# Patient Record
Sex: FEMALE | Race: WHITE | NOT HISPANIC OR LATINO | Employment: UNEMPLOYED | URBAN - METROPOLITAN AREA
[De-identification: names, ages, dates, MRNs, and addresses within clinical notes are randomized per-mention and may not be internally consistent; named-entity substitution may affect disease eponyms.]

---

## 2016-11-28 LAB — HCV AB SER-ACNC: NEGATIVE

## 2017-01-04 ENCOUNTER — ALLSCRIPTS OFFICE VISIT (OUTPATIENT)
Dept: OTHER | Facility: OTHER | Age: 57
End: 2017-01-04

## 2017-01-04 LAB — S PYO AG THROAT QL: NEGATIVE

## 2017-05-22 ENCOUNTER — GENERIC CONVERSION - ENCOUNTER (OUTPATIENT)
Dept: OTHER | Facility: OTHER | Age: 57
End: 2017-05-22

## 2017-05-23 ENCOUNTER — ALLSCRIPTS OFFICE VISIT (OUTPATIENT)
Dept: OTHER | Facility: OTHER | Age: 57
End: 2017-05-23

## 2017-08-21 ENCOUNTER — ALLSCRIPTS OFFICE VISIT (OUTPATIENT)
Dept: OTHER | Facility: OTHER | Age: 57
End: 2017-08-21

## 2017-08-21 DIAGNOSIS — E78.1 PURE HYPERGLYCERIDEMIA: ICD-10-CM

## 2017-08-21 DIAGNOSIS — Z11.59 ENCOUNTER FOR SCREENING FOR OTHER VIRAL DISEASES: ICD-10-CM

## 2017-10-03 LAB — HCV AB SER-ACNC: NEGATIVE

## 2017-10-24 ENCOUNTER — GENERIC CONVERSION - ENCOUNTER (OUTPATIENT)
Dept: OTHER | Facility: OTHER | Age: 57
End: 2017-10-24

## 2017-10-30 ENCOUNTER — ALLSCRIPTS OFFICE VISIT (OUTPATIENT)
Dept: OTHER | Facility: OTHER | Age: 57
End: 2017-10-30
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
## 2017-10-30 DIAGNOSIS — I48.0 PAROXYSMAL ATRIAL FIBRILLATION (HCC): ICD-10-CM

## 2017-11-17 ENCOUNTER — GENERIC CONVERSION - ENCOUNTER (OUTPATIENT)
Dept: OTHER | Facility: OTHER | Age: 57
End: 2017-11-17

## 2017-11-30 ENCOUNTER — GENERIC CONVERSION - ENCOUNTER (OUTPATIENT)
Dept: OTHER | Facility: OTHER | Age: 57
End: 2017-11-30

## 2017-12-01 ENCOUNTER — GENERIC CONVERSION - ENCOUNTER (OUTPATIENT)
Dept: INTERNAL MEDICINE CLINIC | Facility: CLINIC | Age: 57
End: 2017-12-01

## 2018-01-10 NOTE — MISCELLANEOUS
Assessment    1  Paroxysmal atrial fibrillation (427 31) (I48 0)   2  Tick bite (919 4,E906 4) (W57 XXXA)    Plan  Need for prophylactic vaccination and inoculation against influenza    · Fluzone Quadrivalent 0 5 ML Intramuscular Suspension Prefilled Syringe   For: Need for prophylactic vaccination and inoculation against influenza; Ordered By:Reyes, Lea; Effective Date:30Oct2017; Administered by: Rahul Sheth: 10/30/2017 11:49:00 AM; Last Updated By: Rahul Sheth; 10/30/2017 11:50:29 AM  Paroxysmal atrial fibrillation    · (1) T3 TOTAL; Status:Active; Requested GCZ:[id number];    Perform:[facility]; PVB:[id number]; Ordered; For:Paroxysmal atrial fibrillation; Ordered By:Reyes, Lea;   · (1) T4, FREE; Status:Active; Requested SJW:[id number];    Perform:[facility]; MFS:21NUG3387; Ordered; For:Paroxysmal atrial fibrillation; Ordered By:Reyes, Lea;   · (1) TSH; Status:Active; Requested CJS:[id number];    Perform:[facility]; MNO:05RKN3392; Ordered; For:Paroxysmal atrial fibrillation; Ordered By:Reyes, Lea;  Tick bite    · (1) LYME ANTIBODY PROFILE W/REFLEX TO WESTERN BLOT; Status:Active; Requested  VKU:[id number];    Perform:[facility]; BWW:25IWK7465; Ordered; For:Tick bite; Ordered By:Reyes, Lea;    Discussion/Summary  Discussion Summary:   Anticipate cardiac monitoring, f/u with cariology  Cont metoprolol  Obtain BP cuff to check at home  Obtain thyroid function tests although the one in the ER was normal  RTO as scheduled in Feb  Counseling Documentation With Imm: The patient was counseled regarding diagnostic results, impressions  Medication SE Review and Pt Understands Tx: Possible side effects of new medications were reviewed with the patient/guardian today  The treatment plan was reviewed with the patient/guardian   The patient/guardian understands and agrees with the treatment plan      Chief Complaint  Chief Complaint Free Text Note Form: TCM      History of Present Illness  TCM Communication [facility]: The patient is being contacted for follow-up after hospitalization  Hospital records were reviewed  She was hospitalized at [facility]  The date of admission: 10/20/2017, date of discharge: 10/22/2017  Diagnosis: Atrial fibrilation  She was discharged to home  Medications were not reviewed today  She scheduled a follow up appointment  The patient is currently asymptomatic  Counseling was provided to the patient  Communication performed and completed by Jamin Sofia   HPI: Admitted overnight after presenting to the ER with abd discomfort  She initially thought it was a UTI which did not get better with Cipro-took 5 doses and hesitancy resolved  While in the ER, she went into Afib, felt her heart racing, HR in the 190s treated with a Cardizem drip  She underwent an extensive work up including CT chest abd pelvis echo and abd US, all unremarkable  She was also evaluated by cardiology, urology, and GI  Discharged on metoprolol 6 25mg BID  She is already on a baby ASA  Appt with cariology in about 2 weeks  TSH was 0 38 in the hospital which bothers her since it was 2-3 last year  GI symptoms resolved after she was placed in Flagyl by her GI for SIBO  Requesting Lyme test for h/o tick bites  MTX and prednisone doses recently increased by her rheumatologist       Review of Systems  Complete-Female:   Constitutional: feeling tired, but no fever, no recent weight gain, no chills and no recent weight loss  Cardiovascular: as noted in HPI, no chest pain, no palpitations and no lower extremity edema  Respiratory: no shortness of breath and no cough  Gastrointestinal: as noted in HPI  Genitourinary: as noted in HPI and no dysuria  Active Problems    1  Closed nondisplaced lateral mass fracture of first cervical vertebra, sequela (905 1)   (S12 041S)   2  Crohn's disease (555 9) (K50 90)   3  Current chronic use of systemic steroids (V58 65) (Z79 52)   4  Depression (311) (F32 9)   5  Essential hypertriglyceridemia (272 1) (E78 1)   6  Insomnia (780 52) (G47 00)   7  Laceration of forehead, sequela (906 0) (S01 81XS)   8  Long-term use of Plaquenil (V58 69) (Z79 899)   9  Need for hepatitis C screening test (V73 89) (Z11 59)   10  Nephrolithiasis (592 0) (N20 0)   11  Rheumatoid arthritis (714 0) (M06 9)   12  Symptomatic menopausal or female climacteric states (627 2) (N95 1)   13  Urinary hesitancy (788 64) (R39 11)    Past Medical History    1  History of Concussion, with LOC of 30 min or less, sequela   2  History of fatigue (V13 89) (Z87 898)   3  History of urinary tract infection (V13 02) (Z87 440)   4  History of Sore throat (462) (J02 9)   5  History of Sore throat (462) (J02 9)    Surgical History  Surgical History Reviewed: The surgical history was reviewed and updated today  Family History  Family History    1  Family history of CAD (coronary artery disease)   2  Family history of cardiac disorder (V17 49) (Z82 49)  Family History Reviewed: The family history was reviewed and updated today  Social History    · Alcohol use (V49 89) (Z78 9)   ·    · Former smoker (U02 80) (G74 955)   · Occasional alcohol use   · Pets/Animals: Dog   · Two children  Social History Reviewed: The social history was reviewed and updated today  Current Meds   1  Cyanocobalamin 1000 MCG/ML Injection Solution; INJECT 1 ML INTRAMUSCULARLY   WEEKLY; Therapy: 33TTE5973 to (Evaluate:30Oct2017)  Requested for: 24WSV8688; Last   Rx:71Zdr7033 Ordered   2  DULoxetine HCl - 60 MG Oral Capsule Delayed Release Particles; take 1 capsule daily; Therapy: 90JPJ2929 to (Evaluate:16Nov2017)  Requested for: 21Nov2016; Last   Rx:21Nov2016 Ordered   3  Folic Acid 1 MG Oral Tablet; TAKE 1 TABLET DAILY; Therapy: 85ZVY2934 to (Evaluate:41Nie5264) Recorded   4  LORazepam 0 5 MG Oral Tablet; TAKE 1 TABLET DAILY AS NEEDED; Therapy: 93Wpn2092 to (Evaluate:22Apr2017);  Last Rx: 49OQN0482 Ordered   5  LORazepam 1 MG Oral Tablet; take 1 tablet by mouth at bedtime as needed; Therapy: 58VQO1980 to ([facility])  Requested for: 98Gij0034; Last   Rx:96Qdp7830 Ordered   6  MedroxyPROGESTERone Acetate 2 5 MG Oral Tablet; TAKE 1 TABLET DAILY 10-14 days   a month; Therapy: 29HGM8259 to Recorded   7  Menest 0 625 MG Oral Tablet; TAKE 1 TABLET DAILY; Therapy: 80BGT0564 to Recorded   8  Methotrexate Sodium 250 MG/10ML Injection Solution; 1ml weekly; Therapy: 34ATG6730 to Recorded   9  Metoprolol Tartrate 25 MG Oral Tablet; 1/2 tab bid; Therapy: 22EIV4736 to Recorded   10  PredniSONE 5 MG Oral Tablet; TAKE 1 0 5 TABLET BY MOUTH ONCE DAILY -;    Therapy: 65GRL6122 to (Evaluate:04Zeh4125)  Requested for: 78JJB7376 Recorded   11  Syringe 2G X 1-1/4" 3 ML Miscellaneous; USE AS DIRECTED WEEKLY WITH THE B12; Therapy: 80EUC5593 to (Last Rx:61Lsy7649)  Requested for: 05GGC0660 Ordered   12  TraMADol HCl - 50 MG Oral Tablet; TAKE 1 TABLET BY MOUTH EVERY 6 TO 8 HOURS    AS NEEDED FOR PAIN;    Therapy: 03LZS4741 to (Evaluate:22Jun2017); Last Rx:41Nob3107 Ordered   13  Tylenol 325 MG Oral Tablet; TAKE 1 OR 2 TABLET EVERY 4 HOURS AS NEEDED FOR    PAIN/FEVER; Therapy: 67FMR7541 to Recorded  Medication List Reviewed: The medication list was reviewed and updated today  Allergies    1  Orencia   2  Remicade    3  No Known Environmental Allergies   4  No Known Food Allergies    Vitals  Signs   Recorded: 98JGG5331 11:03AM   Heart Rate: 63  Systolic: 230  Diastolic: 64  Height: 5 ft 5 in  Weight: 139 lb 4 oz  BMI Calculated: 23 17  BSA Calculated: 1 7  O2 Saturation: 98    Physical Exam    Constitutional   General appearance: No acute distress, well appearing and well nourished  Eyes   Conjunctiva and lids: No swelling, erythema or discharge  Ears, Nose, Mouth, and Throat   Otoscopic examination: Tympanic membranes translucent with normal light reflex  Canals patent without erythema  Oropharynx: Normal with no erythema, edema, exudate or lesions  Pulmonary   Respiratory effort: No increased work of breathing or signs of respiratory distress  Auscultation of lungs: Clear to auscultation  Cardiovascular   Auscultation of heart: Normal rate and rhythm, normal S1 and S2, without murmurs  Abdomen   Abdomen: Non-tender, no masses  Liver and spleen: No hepatomegaly or splenomegaly  Lymphatic   Palpation of lymph nodes in neck: No lymphadenopathy  Musculoskeletal   Gait and station: Normal     Psychiatric   Orientation to person, place, and time: Normal     Mood and affect: Normal          Future Appointments    Date/Time Provider Specialty Site   02/13/2018 10:30 AM RAHUL Vela   [facility]     Signatures   Electronically signed by : Binta Zuniga, ; Oct 24 2017 11:34AM EST                       (Author)    Electronically signed by : RAHUL Jacobs ; Oct 30 2017  1:03PM EST                       (Author)

## 2018-01-11 NOTE — PROGRESS NOTES
Assessment    1  History of Concussion, with LOC of 30 min or less, sequela (907 0) (S06 0X1S)   2  Need for hepatitis C screening test (V73 89) (Z11 59)   3  Encounter for preventive health examination (V70 0) (Z00 00)    Plan  Essential hypertriglyceridemia    · (1) LIPID PANEL FASTING W DIRECT LDL REFLEX; Status:Active; Requested  for:24Ljh4595;   Need for hepatitis C screening test    · (1) HEP C ANTIBODY; Status:Active; Requested for:30Grq5440;     Discussion/Summary  health maintenance visit Currently, she eats an adequate diet and has an adequate exercise regimen  cervical cancer screening is current Breast cancer screening: mammogram is current  Colorectal cancer screening: colorectal cancer screening is current  Osteoporosis screening: bone mineral density testing is current  Screening lab work includes lipid profile  The Flu vaccine declined  Hepatitis C Screening:  The patient agrees to Hepatitis C screening  Appt with ophthalmologist tomorrow  Trial of oral antihistamine for left ear discomfort  Start krill oil- elev trig, dry eyes, joint aches  f/u with rheum (will stop Remicade- flare after getting infusion)  f/u with GI  rto 6months  The patient was counseled regarding impressions  Possible side effects of new medications were reviewed with the patient/guardian today  The treatment plan was reviewed with the patient/guardian  The patient/guardian understands and agrees with the treatment plan      Chief Complaint  Wellness  History of Present Illness  HM, Adult Female: The last health maintenance visit was 1 year(s) ago  Social History: Household members include 1 daughter(s) and 1 son(s)  She is unmarried  Work status: currently on disability  The patient is a former cigarette smoker and quit smoking 20 years  She reports frequent alcohol use and drinking 2-3 drinks per week  The patient has no concerns about alcohol abuse  She has never used illicit drugs  General Health:  The patient's health since the last visit is described as good  She does not have regular dental visits  The patient brushes 4 time(s) a day, flosses 1 time(s) a day and reports her last dental visit was 2 years  She complains of vision problems  Vision care includes wearing glasses and having regular eye examinations  The patient complains of Floaters in right eye  She denies hearing loss  Hearing is normal   She doesn't wear a hearing aid  Immunizations status: up to date  Lifestyle:  She consumes a diverse and healthy diet  She does not have any weight concerns  She exercises regularly  She exercises 4 times per week for 90 minutes per session  Exercise includes stretching/yoga/pilates  She does not use tobacco  The patient is a former cigarette smoker and QUIT 20 YEARS AGO  She consumes alcohol  She reports frequent alcohol use and drinking 2-3 drinks per week  She typically drinks hard liquor, but no beer consumption and no wine consumption  Alcohol concern: The patient has no concerns about alcohol abuse  She denies drug use  She has never used illicit drugs  Reproductive health: the patient is postmenopausal   she reports normal menses  she uses no contraception  she is sexually active  She is monogamous with a male partner  pregnancy history: G 2P 2  Screening: Cervical cancer screening includes a pap smear performed last year  Breast cancer screening includes a mammogram performed last year  Colorectal cancer screening includes a colonoscopy performed 2016  Metabolic screening includes lipid profile performed last year, glucose screening performed last year and thyroid function test performed last year  Cardiovascular risk factors: stress, but no hypertension, no high LDL cholesterol, no low HDL cholesterol, no obesity, no tobacco use, no illicit drug use, no sedentary lifestyle and no family history of cardiovascular disease     HPI: right eye floaters since yesterday  She has an appt with ophthalmology tomorrow  chronic left ear pain, h/o fluid on that side  using Flonase daily  reports that she stopped Fosamax bec of GI upset  RA flare in July in spite of Remicade- MTX increased       Review of Systems    Constitutional: feeling tired, but no fever, no recent weight gain, no chills and no recent weight loss  Eyes: as noted in HPI    ENT: earache, but as noted in HPI  Cardiovascular: no chest pain and no palpitations  Respiratory: no shortness of breath and no cough  Gastrointestinal: no abdominal pain, no constipation and no diarrhea  Genitourinary: no dysuria, no incontinence and no unexplained vaginal bleeding  Musculoskeletal: arthralgias  Active Problems    1  Closed nondisplaced lateral mass fracture of first cervical vertebra, sequela (805 01)   (S12 041S)   2  Crohn's disease (555 9) (K50 90)   3  Current chronic use of systemic steroids (V58 65) (Z79 52)   4  Depression (311) (F32 9)   5  Essential hypertriglyceridemia (272 1) (E78 1)   6  Insomnia (780 52) (G47 00)   7  Laceration of forehead, sequela (906 0) (S01 81XS)   8  Long-term use of Plaquenil (V58 69) (Z79 899)   9  Nephrolithiasis (592 0) (N20 0)   10  Rheumatoid arthritis (714 0) (M06 9)   11  Symptomatic menopausal or female climacteric states (627 2) (N95 1)   12  Urinary hesitancy (788 64) (R39 11)    Past Medical History    · History of Concussion, with LOC of 30 min or less, sequela (907 0) (S06 0X1S)   · History of fatigue (V13 89) (P57 367)   · History of urinary tract infection (V13 02) (Z87 440)   · History of Sore throat (462) (J02 9)   · History of Sore throat (462) (J02 9)    Family History  Family History    · Family history of CAD (coronary artery disease)   · Family history of cardiac disorder (V17 49) (Z82 49)    Social History    · Alcohol use (V49 89) (Z78 9)   ·    · Former smoker (V15 82) (G17 274)   · Occasional alcohol use   · Pets/Animals: Dog   · Two children    Current Meds   1  Cyanocobalamin 1000 MCG/ML Injection Solution; INJECT 1 ML INTRAMUSCULARLY   WEEKLY; Therapy: 22QOB4842 to (Evaluate:30Oct2017)  Requested for: 79MUV4418; Last   Rx:66Dev8840 Ordered   2  DULoxetine HCl - 60 MG Oral Capsule Delayed Release Particles; take 1 capsule daily; Therapy: 33TKR6317 to (Evaluate:16Nov2017)  Requested for: 10Rhy0437; Last   Rx:21Nov2016 Ordered   3  Folic Acid 1 MG Oral Tablet; TAKE 1 TABLET DAILY; Therapy: 00GWT0317 to (Evaluate:73Kgk8497) Recorded   4  LORazepam 0 5 MG Oral Tablet; TAKE 1 TABLET DAILY AS NEEDED; Therapy: 72Qtx4126 to (Evaluate:22Apr2017); Last Rx:36Aga7068 Ordered   5  LORazepam 1 MG Oral Tablet; take 1 tablet by mouth at bedtime as needed; Therapy: 58EEN7267 to (Evaluate:28Nyu8573)  Requested for: 14SKJ7433; Last   Rx:29Riv7304 Ordered   6  MedroxyPROGESTERone Acetate 2 5 MG Oral Tablet; TAKE 1 TABLET DAILY 10-14   days a month; Therapy: 77QXL5722 to Recorded   7  Menest 0 625 MG Oral Tablet; TAKE 1 TABLET DAILY; Therapy: 85CNQ6452 to Recorded   8  Methotrexate Sodium 250 MG/10ML Injection Solution; 1ml weekly; Therapy: 34SGL5929 to Recorded   9  PredniSONE 5 MG Oral Tablet; TAKE 1 TABLET BY MOUTH ONCE DAILY -;   Therapy: 45EPM0752 to ([facility])  Requested for: 25YWR2389 Recorded   10  Syringe 2G X 1-1/4" 3 ML Miscellaneous; USE AS DIRECTED WEEKLY WITH THE B12; Therapy: 80XSE1385 to (Last Rx:23May2017)  Requested for: 09ESY0578 Ordered   11  TraMADol HCl - 50 MG Oral Tablet; TAKE 1 TABLET BY MOUTH EVERY 6 TO 8 HOURS    AS NEEDED FOR PAIN;    Therapy: 05KFF6926 to (Evaluate:22Jun2017); Last Rx:13Vhv1495 Ordered   12  Tylenol 325 MG Oral Tablet; TAKE 1 OR 2 TABLET EVERY 4 HOURS AS NEEDED FOR    PAIN/FEVER; Therapy: 02SRJ4492 to Recorded    Allergies    1  Orencia   2  Remicade    3  No Known Environmental Allergies   4   No Known Food Allergies    Vitals   Recorded: 76Rzu1882 10:42AM   Temperature 98 2 F   Heart Rate 69   Respiration 16 Systolic 655   Diastolic 70   Height 5 ft 5 in   Weight 136 lb 0 8 oz   BMI Calculated 22 64   BSA Calculated 1 68   O2 Saturation 99     Physical Exam    Constitutional   General appearance: No acute distress, well appearing and well nourished  Head and Face   Head and face: Normal     Eyes   Conjunctiva and lids: No swelling, erythema or discharge  Pupils and irises: Equal, round, reactive to light  Ears, Nose, Mouth, and Throat   Otoscopic examination: Tympanic membranes translucent with normal light reflex  Canals patent without erythema  Oropharynx: Normal with no erythema, edema, exudate or lesions  Neck   Neck: Supple, symmetric, trachea midline, no masses  Thyroid: Normal, no thyromegaly  Pulmonary   Respiratory effort: No increased work of breathing or signs of respiratory distress  Auscultation of lungs: Clear to auscultation  Cardiovascular   Auscultation of heart: Normal rate and rhythm, normal S1 and S2, no murmurs  Abdomen   Abdomen: Non-tender, no masses  Lymphatic   Palpation of lymph nodes in neck: No lymphadenopathy      Musculoskeletal   Gait and station: Normal     Psychiatric   Orientation to person, place, and time: Normal     Mood and affect: Normal        Signatures   Electronically signed by : Corinne Ferries, M D ; Aug 21 2017 11:20AM EST                       (Author)

## 2018-01-12 VITALS
SYSTOLIC BLOOD PRESSURE: 120 MMHG | BODY MASS INDEX: 23.2 KG/M2 | DIASTOLIC BLOOD PRESSURE: 64 MMHG | HEIGHT: 65 IN | HEART RATE: 63 BPM | OXYGEN SATURATION: 98 % | WEIGHT: 139.25 LBS

## 2018-01-12 VITALS
HEART RATE: 73 BPM | HEIGHT: 65 IN | SYSTOLIC BLOOD PRESSURE: 118 MMHG | WEIGHT: 138.6 LBS | OXYGEN SATURATION: 99 % | DIASTOLIC BLOOD PRESSURE: 64 MMHG | BODY MASS INDEX: 23.09 KG/M2

## 2018-01-13 VITALS
SYSTOLIC BLOOD PRESSURE: 102 MMHG | TEMPERATURE: 98.2 F | HEIGHT: 65 IN | HEART RATE: 69 BPM | DIASTOLIC BLOOD PRESSURE: 70 MMHG | RESPIRATION RATE: 16 BRPM | WEIGHT: 136.05 LBS | OXYGEN SATURATION: 99 % | BODY MASS INDEX: 22.67 KG/M2

## 2018-01-14 NOTE — RESULT NOTES
Message   Her urinalysis is clean, no signs of an infection   Increase oral fluids   She may try OTC Azo for the discomfort for now        Verified Results  (1) URINALYSIS w URINE C/S REFLEX (will reflex a microscopy if leukocytes, occult blood, or nitrites are not within normal limits) 05JEW6852 12:20PM Maico Mcgrath     Test Name Result Flag Reference   COLOR Yellow     CLARITY Turbid     PH UA 5 0  4 5-8 0   LEUKOCYTE ESTERASE UA Negative  Negative   NITRITE UA Negative  Negative   PROTEIN UA Negative mg/dl  Negative   GLUCOSE UA Negative mg/dl  Negative   KETONES UA Negative mg/dl  Negative   UROBILINOGEN UA 0 2 E U /dl  0 2, 1 0 E U /dl   BILIRUBIN UA Negative  Negative   BLOOD UA Negative  Negative   SPECIFIC GRAVITY UA 1 024  1 003-1 030       Signatures   Electronically signed by : RAHUL Stewart ; Mar 24 2016  8:33AM EST                       (Author)

## 2018-01-15 VITALS
WEIGHT: 141.04 LBS | HEIGHT: 65 IN | RESPIRATION RATE: 16 BRPM | HEART RATE: 71 BPM | TEMPERATURE: 98.1 F | DIASTOLIC BLOOD PRESSURE: 80 MMHG | BODY MASS INDEX: 23.5 KG/M2 | OXYGEN SATURATION: 98 % | SYSTOLIC BLOOD PRESSURE: 124 MMHG

## 2018-01-22 VITALS — TEMPERATURE: 98.5 F

## 2018-02-13 ENCOUNTER — OFFICE VISIT (OUTPATIENT)
Dept: INTERNAL MEDICINE CLINIC | Facility: CLINIC | Age: 58
End: 2018-02-13
Payer: MEDICARE

## 2018-02-13 VITALS
OXYGEN SATURATION: 98 % | DIASTOLIC BLOOD PRESSURE: 78 MMHG | SYSTOLIC BLOOD PRESSURE: 118 MMHG | BODY MASS INDEX: 23.49 KG/M2 | HEART RATE: 73 BPM | HEIGHT: 65 IN | WEIGHT: 141 LBS

## 2018-02-13 DIAGNOSIS — E78.1 ESSENTIAL HYPERTRIGLYCERIDEMIA: ICD-10-CM

## 2018-02-13 DIAGNOSIS — I48.91 ATRIAL FIBRILLATION, UNSPECIFIED TYPE (HCC): ICD-10-CM

## 2018-02-13 DIAGNOSIS — M05.79 RHEUMATOID ARTHRITIS INVOLVING MULTIPLE SITES WITH POSITIVE RHEUMATOID FACTOR (HCC): ICD-10-CM

## 2018-02-13 DIAGNOSIS — R53.83 FATIGUE, UNSPECIFIED TYPE: Primary | ICD-10-CM

## 2018-02-13 DIAGNOSIS — K50.919 CROHN'S DISEASE WITH COMPLICATION, UNSPECIFIED GASTROINTESTINAL TRACT LOCATION (HCC): ICD-10-CM

## 2018-02-13 DIAGNOSIS — H04.123 BILATERAL DRY EYES: ICD-10-CM

## 2018-02-13 PROBLEM — K50.90 CROHN DISEASE (HCC): Status: ACTIVE | Noted: 2018-02-13

## 2018-02-13 PROCEDURE — 99214 OFFICE O/P EST MOD 30 MIN: CPT | Performed by: INTERNAL MEDICINE

## 2018-02-13 RX ORDER — ESTRADIOL 0.5 MG/.5G
GEL TOPICAL
COMMUNITY
Start: 2018-02-05 | End: 2019-01-14 | Stop reason: ALTCHOICE

## 2018-02-13 RX ORDER — FOLIC ACID 1 MG/1
1 TABLET ORAL DAILY
COMMUNITY
Start: 2016-03-22 | End: 2019-01-14 | Stop reason: HOSPADM

## 2018-02-13 RX ORDER — MEDROXYPROGESTERONE ACETATE 2.5 MG/1
TABLET ORAL
COMMUNITY
Start: 2016-03-22 | End: 2019-01-14 | Stop reason: DRUGHIGH

## 2018-02-13 RX ORDER — ACETAMINOPHEN 500 MG
500 TABLET ORAL EVERY 6 HOURS
COMMUNITY
Start: 2017-05-19

## 2018-02-13 RX ORDER — METHOTREXATE 25 MG/ML
INJECTION INTRA-ARTERIAL; INTRAMUSCULAR; INTRATHECAL; INTRAVENOUS
COMMUNITY
Start: 2017-05-24 | End: 2019-01-14 | Stop reason: ALTCHOICE

## 2018-02-13 RX ORDER — CYCLOSPORINE 0.5 MG/ML
1 EMULSION OPHTHALMIC 2 TIMES DAILY
Qty: 5.5 ML | Refills: 0 | Status: SHIPPED | OUTPATIENT
Start: 2018-02-13 | End: 2019-01-14

## 2018-02-13 RX ORDER — PREDNISONE 1 MG/1
7.5 TABLET ORAL DAILY
COMMUNITY
Start: 2016-03-22 | End: 2020-01-07

## 2018-02-13 RX ORDER — CHOLECALCIFEROL (VITAMIN D3) 10 MCG
400 TABLET ORAL DAILY
COMMUNITY

## 2018-02-13 RX ORDER — CYANOCOBALAMIN 1000 UG/ML
INJECTION INTRAMUSCULAR; SUBCUTANEOUS
COMMUNITY
Start: 2016-03-22 | End: 2018-06-25 | Stop reason: SDUPTHER

## 2018-02-13 RX ORDER — METRONIDAZOLE 500 MG/1
500 TABLET ORAL
COMMUNITY
Start: 2017-10-27 | End: 2019-01-14 | Stop reason: ALTCHOICE

## 2018-02-13 RX ORDER — LORAZEPAM 1 MG/1
1 TABLET ORAL
COMMUNITY
Start: 2016-03-22 | End: 2018-03-01 | Stop reason: SDUPTHER

## 2018-02-13 RX ORDER — DULOXETIN HYDROCHLORIDE 60 MG/1
1 CAPSULE, DELAYED RELEASE ORAL DAILY
COMMUNITY
Start: 2016-03-22 | End: 2018-11-17 | Stop reason: SDUPTHER

## 2018-02-13 RX ORDER — TRAMADOL HYDROCHLORIDE 50 MG/1
1 TABLET ORAL
COMMUNITY
Start: 2017-05-23 | End: 2019-01-14 | Stop reason: ALTCHOICE

## 2018-02-13 NOTE — ASSESSMENT & PLAN NOTE
Her thyroid function tests are in the low normal range  ? central cause  She had what sounds like a dexamethasone suppression test recently which she reports was normal  I think her fatigue is multifactorial -chronic medical issues and her medications, certainly the steroids, possibly the beta blocker?

## 2018-02-13 NOTE — PROGRESS NOTES
Assessment/Plan:    Fatigue  Her thyroid function tests are in the low normal range  ? central cause  She had what sounds like a dexamethasone suppression test recently which she reports was normal  I think her fatigue is multifactorial -chronic medical issues and her medications, certainly the steroids, possibly the beta blocker? Crohn disease ([place] [address] )  Now on Entyvio    Atrial fibrillation ([place] [address] )  On ASA and metoprolol which she cuts in quarters  Afib was noted when she was hospitalized for abd pain in October  She will discuss the need for this with her cardiologist    Rheumatoid arthritis [facility])  Managed by rheumatology  She has had difficulty weaning off the prednisone  She remains on MTX         Problem List Items Addressed This Visit     Atrial fibrillation ([place] [address] )     On ASA and metoprolol which she cuts in quarters  Afib was noted when she was hospitalized for abd pain in October  She will discuss the need for this with her cardiologist         Crohn disease ([place] [address] )     Now on Entyvio         Relevant Medications    vedolizumab (ENTYVIO) 300 MG SOLR    Essential hypertriglyceridemia    Relevant Orders    Lipid panel    Rheumatoid arthritis ([place] [address] )     Managed by rheumatology  She has had difficulty weaning off the prednisone  She remains on MTX         Fatigue - Primary     Her thyroid function tests are in the low normal range  ? central cause  She had what sounds like a dexamethasone suppression test recently which she reports was normal  I think her fatigue is multifactorial -chronic medical issues and her medications, certainly the steroids, possibly the beta blocker? Relevant Orders    TSH, 3rd generation    T4, free    T3    Ambulatory referral to Endocrinology    Vitamin D 25 hydroxy    Bilateral dry eyes    Relevant Medications    cycloSPORINE (RESTASIS) 0 05 % ophthalmic emulsion            Subjective:      Patient ID: Nicole Couch is a 61 y o  female      Reports that she is not doing very well  She is feeling extremely tired, some days can barely get out of bed  RA is not controlled  Her rheumatologist is trying to get her off prednisone but even from 6 to 5mg she had much more joint pain so resumed 6mg recently  She just started [facility] for Crohns  This has helped with her chronic bloating  Denies diarrhea, + loose stools  Asking about her thyroid since her TSH T4 and T3 are all low normal  TSH has dropped in the past year and a half  The following portions of the patient's history were reviewed and updated as appropriate: allergies, current medications, past family history, past medical history, past social history, past surgical history and problem list   No past medical history on file  No past surgical history on file  Social History   Substance Use Topics    Smoking status: Former Smoker    Smokeless tobacco: Never Used    Alcohol use Yes      Comment: social     No family history on file    Current Outpatient Prescriptions   Medication Sig Dispense Refill    acetaminophen (TYLENOL) 500 mg tablet Take 500 mg by mouth every 6 (six) hours      aspirin 81 MG tablet Take 81 mg by mouth      Cholecalciferol 2000 units CAPS Take 1 capsule by mouth      cyanocobalamin 1,000 mcg/mL Inject as directed      DIVIGEL 0 5 MG/0 5GM GEL       DULoxetine (CYMBALTA) 60 mg delayed release capsule Take 1 capsule by mouth daily      esterified estrogens (MENEST) 0 625 MG tablet Take 1 tablet by mouth daily      folic acid (FOLVITE) 1 mg tablet Take 1 tablet by mouth daily      LORazepam (ATIVAN) 1 mg tablet Take 1 tablet by mouth      Magnesium Gluconate 27 5 MG TABS Take 500 mg by mouth      medroxyPROGESTERone (PROVERA) 2 5 mg tablet Take by mouth      Methotrexate Sodium (METHOTREXATE, PF,) 250 mg/10 mL injection Inject as directed      metoprolol tartrate (LOPRESSOR) 25 mg tablet Take 0 5 tablets by mouth 2 (two) times a day      predniSONE 5 mg tablet Take 6 mg by mouth daily        progesterone (PROMETRIUM) 100 MG capsule Take 100 mg by mouth      SYRINGE-NEEDLE, DISP, 3 ML (B-D 3CC LUER-RITESH SYR 23GX1") 23G X 1" 3 ML MISC by Does not apply route      Syringe/Needle, Disp, (SYRINGE 3CC/76KC7-2/4") 2G X 1-1/4" 3 ML MISC by Does not apply route      Tofacitinib Citrate 11 MG TB24 Take 11 mg by mouth      cycloSPORINE (RESTASIS) 0 05 % ophthalmic emulsion Administer 1 drop to both eyes 2 (two) times a day 5 5 mL 0    metroNIDAZOLE (FLAGYL) 500 mg tablet Take 500 mg by mouth      traMADol (ULTRAM) 50 mg tablet Take 1 tablet by mouth      vedolizumab (ENTYVIO) 300 MG SOLR Every 8 weeks  0     No current facility-administered medications for this visit  Allergies   Allergen Reactions    Infliximab Other (See Comments)     Drug induced lupus    Abatacept Rash     Review of Systems   Constitutional: Positive for fatigue  Negative for fever and unexpected weight change  HENT: Negative for ear pain, hearing loss, sinus pain, sinus pressure and sore throat  Eyes:        Dry eyes, asking for Restasis which she has sued in the past   Respiratory: Negative for cough, shortness of breath and wheezing  Cardiovascular: Negative for chest pain, palpitations and leg swelling  Gastrointestinal: Negative for abdominal pain, constipation, diarrhea, nausea and vomiting  Endocrine: Positive for cold intolerance  Musculoskeletal: Positive for arthralgias  Objective:  Vitals:    02/13/18 1051   BP: 118/78   BP Location: Left arm   Patient Position: Sitting   Cuff Size: Standard   Pulse: 73   SpO2: 98%   Weight: 64 kg (141 lb)   Height: 5' 5" (1 651 m)        Physical Exam   Constitutional: She is oriented to person, place, and time  She appears well-developed and well-nourished  HENT:   Head: Normocephalic and atraumatic     Right Ear: External ear normal    Left Ear: External ear normal    Mouth/Throat: Oropharynx is clear and moist    Eyes: Conjunctivae are normal    Neck: Neck supple  Cardiovascular: Normal rate, regular rhythm and normal heart sounds  No murmur heard  Pulmonary/Chest: Effort normal and breath sounds normal  No respiratory distress  She has no wheezes  She has no rales  Abdominal: Soft  Bowel sounds are normal  She exhibits no distension and no mass  There is no tenderness  There is no rebound and no guarding  Musculoskeletal: She exhibits edema (hands/fingers)  Neurological: She is alert and oriented to person, place, and time  Skin: Skin is warm and dry  Psychiatric: She has a normal mood and affect   Her behavior is normal  Judgment and thought content normal

## 2018-02-13 NOTE — ASSESSMENT & PLAN NOTE
On ASA and metoprolol which she cuts in quarters  Afib was noted when she was hospitalized for abd pain in October  She will discuss the need for this with her cardiologist

## 2018-03-01 ENCOUNTER — TELEPHONE (OUTPATIENT)
Dept: OTHER | Facility: HOSPITAL | Age: 58
End: 2018-03-01

## 2018-03-01 DIAGNOSIS — F41.9 ANXIETY: Primary | ICD-10-CM

## 2018-03-02 ENCOUNTER — TELEPHONE (OUTPATIENT)
Dept: INTERNAL MEDICINE CLINIC | Facility: CLINIC | Age: 58
End: 2018-03-02

## 2018-03-02 RX ORDER — LORAZEPAM 1 MG/1
TABLET ORAL
Qty: 60 TABLET | Refills: 1 | Status: SHIPPED | OUTPATIENT
Start: 2018-03-02 | End: 2018-06-24 | Stop reason: SDUPTHER

## 2018-06-24 DIAGNOSIS — F41.9 ANXIETY: ICD-10-CM

## 2018-06-25 DIAGNOSIS — E53.8 B12 DEFICIENCY: Primary | ICD-10-CM

## 2018-06-25 RX ORDER — CYANOCOBALAMIN 1000 UG/ML
1000 INJECTION INTRAMUSCULAR; SUBCUTANEOUS
Qty: 25 ML | Refills: 1 | Status: SHIPPED | OUTPATIENT
Start: 2018-06-25 | End: 2019-01-14 | Stop reason: SDUPTHER

## 2018-06-25 RX ORDER — LORAZEPAM 1 MG/1
TABLET ORAL
Qty: 60 TABLET | Refills: 1 | Status: SHIPPED | OUTPATIENT
Start: 2018-06-25 | End: 2018-09-27 | Stop reason: ALTCHOICE

## 2018-07-11 RX ORDER — HYDROXYCHLOROQUINE SULFATE 200 MG/1
TABLET, FILM COATED ORAL DAILY
COMMUNITY
End: 2019-08-20 | Stop reason: ALTCHOICE

## 2018-07-11 RX ORDER — VALACYCLOVIR HYDROCHLORIDE 500 MG/1
TABLET, FILM COATED ORAL
COMMUNITY
End: 2019-08-20 | Stop reason: SDUPTHER

## 2018-07-24 ENCOUNTER — OFFICE VISIT (OUTPATIENT)
Dept: INTERNAL MEDICINE CLINIC | Facility: CLINIC | Age: 58
End: 2018-07-24
Payer: MEDICARE

## 2018-07-24 VITALS
WEIGHT: 137 LBS | BODY MASS INDEX: 22.82 KG/M2 | HEIGHT: 65 IN | SYSTOLIC BLOOD PRESSURE: 112 MMHG | OXYGEN SATURATION: 98 % | HEART RATE: 78 BPM | DIASTOLIC BLOOD PRESSURE: 66 MMHG

## 2018-07-24 DIAGNOSIS — K50.90 CROHN'S DISEASE WITHOUT COMPLICATION, UNSPECIFIED GASTROINTESTINAL TRACT LOCATION (HCC): ICD-10-CM

## 2018-07-24 DIAGNOSIS — I48.91 ATRIAL FIBRILLATION, UNSPECIFIED TYPE (HCC): ICD-10-CM

## 2018-07-24 DIAGNOSIS — G47.61 PERIODIC LIMB MOVEMENT DISORDER: ICD-10-CM

## 2018-07-24 DIAGNOSIS — E55.9 VITAMIN D DEFICIENCY: ICD-10-CM

## 2018-07-24 DIAGNOSIS — E53.8 VITAMIN B12 DEFICIENCY: ICD-10-CM

## 2018-07-24 DIAGNOSIS — R53.82 CHRONIC FATIGUE SYNDROME: Primary | ICD-10-CM

## 2018-07-24 DIAGNOSIS — M05.79 RHEUMATOID ARTHRITIS INVOLVING MULTIPLE SITES WITH POSITIVE RHEUMATOID FACTOR (HCC): ICD-10-CM

## 2018-07-24 PROBLEM — G93.32 CHRONIC FATIGUE SYNDROME: Status: ACTIVE | Noted: 2018-07-24

## 2018-07-24 PROCEDURE — 99214 OFFICE O/P EST MOD 30 MIN: CPT | Performed by: INTERNAL MEDICINE

## 2018-07-24 RX ORDER — MODAFINIL 100 MG/1
100 TABLET ORAL DAILY
Qty: 10 TABLET | Refills: 0 | Status: SHIPPED | OUTPATIENT
Start: 2018-07-24 | End: 2019-01-14 | Stop reason: ALTCHOICE

## 2018-07-24 RX ORDER — PREDNISONE 1 MG/1
TABLET ORAL
COMMUNITY
Start: 2018-07-03 | End: 2020-01-07

## 2018-07-24 NOTE — PROGRESS NOTES
Assessment/Plan:  Chronic fatigue- I think she is a good candidate for Provigil  She has tried the lifestyle modifications to help with her fatigue and is on medications to address her RA and Crohns  She has tolerated this in the past  If this is not covered for her, we discussed switching to Klonopin although I am not optimistic this will make a major difference  She should seriously consider the dopamine agonists for the periodic limp movement disorder  Problem List Items Addressed This Visit     Atrial fibrillation ([facility] [address] )    Relevant Orders    Ferritin    Crohn disease ([facility] [address] )    Rheumatoid arthritis ([facility] [address] )    Chronic fatigue syndrome - Primary    Relevant Medications    modafinil (PROVIGIL) 100 mg tablet      Other Visit Diagnoses     Vitamin D deficiency        Relevant Orders    Folate    Vitamin D 25 hydroxy    Vitamin B12 deficiency        Relevant Orders    Vitamin B12    Periodic limb movement disorder                Subjective:      Patient ID: Meaghan Bashir is a 61 y o  female  HPI   Long h/o fatigue in spite of sleeping well at night, with the aid of lorazepam  Feels tired all the time and sleepy  Takes long naps during the day  Exercises regularly-Jayden yoga  and eats very healthy  At one point convinced It was her thyroid  Thyroid function tests normal  She even saw endocrine who confirmed this  She was diagnosed with chronic fatigue  She had a sleep study where she was diagnosed with periodic limb movement disorder  Gabapentin and dopamine agonists recommended  Her rheumatologist started her on gabapentin  However, she developed right arm swelling   She took it for 6-8 weeks  RUE swelling has resolved  She is hesitant to take dopamine agonists  She was told Klonopin may be a better choice than lorazepam for this  She has been on Provigil in the past which helped dramatically but this was too expensive  It was prescribed by her rheumatologist in [place]     RA-Stopped Bear Demond of flu like symptoms, increase in joint aches, even vivid dreams  She si back on Paquenil and Rituxan  Crohns is stable, recent colonoscopy      The following portions of the patient's history were reviewed and updated as appropriate: allergies, current medications, past family history, past medical history, past social history, past surgical history and problem list     Review of Systems   Constitutional: Positive for fatigue  Negative for fever and unexpected weight change  HENT: Negative for ear pain, hearing loss, sinus pain, sinus pressure and sore throat  Respiratory: Negative for cough, shortness of breath and wheezing  Cardiovascular: Negative for chest pain, palpitations (mil, intermittent) and leg swelling  Gastrointestinal: Positive for blood in stool (infrequent) and diarrhea  Negative for abdominal pain, constipation, nausea and vomiting  Musculoskeletal: Positive for arthralgias  Negative for myalgias  Neurological: Negative for dizziness and headaches  Objective:      /66   Pulse 78   Ht 5' 5" (1 651 m)   Wt 62 1 kg (137 lb)   SpO2 98%   BMI 22 80 kg/m²          Physical Exam   Constitutional: She is oriented to person, place, and time  She appears well-developed and well-nourished  HENT:   Head: Normocephalic and atraumatic  Right Ear: External ear normal    Left Ear: External ear normal    Mouth/Throat: Oropharynx is clear and moist    Eyes: Conjunctivae are normal    Neck: Neck supple  Cardiovascular: Normal rate, regular rhythm and normal heart sounds  No murmur heard  Pulmonary/Chest: Effort normal and breath sounds normal  No respiratory distress  She has no wheezes  She has no rales  Abdominal: Soft  Bowel sounds are normal  She exhibits no distension and no mass  There is no tenderness  There is no rebound and no guarding  Musculoskeletal: Normal range of motion  Neurological: She is alert and oriented to person, place, and time     Skin: Skin is warm and dry  Psychiatric: She has a normal mood and affect   Her behavior is normal  Judgment and thought content normal

## 2018-08-01 ENCOUNTER — OFFICE VISIT (OUTPATIENT)
Dept: INTERNAL MEDICINE CLINIC | Facility: CLINIC | Age: 58
End: 2018-08-01
Payer: MEDICARE

## 2018-08-01 VITALS
SYSTOLIC BLOOD PRESSURE: 118 MMHG | HEART RATE: 101 BPM | HEIGHT: 65 IN | DIASTOLIC BLOOD PRESSURE: 84 MMHG | OXYGEN SATURATION: 98 % | BODY MASS INDEX: 22.49 KG/M2 | WEIGHT: 135 LBS

## 2018-08-01 DIAGNOSIS — R23.8 EASY BRUISING: Primary | ICD-10-CM

## 2018-08-01 PROCEDURE — 99213 OFFICE O/P EST LOW 20 MIN: CPT | Performed by: NURSE PRACTITIONER

## 2018-08-01 NOTE — PROGRESS NOTES
Assessment/Plan:    Concerned about thrombocytopenia since recently starting on plaquenil    she was advised to get labs done now  Call rheumatology to make them aware of bruising and increase blood in stools to see if they are ok stopping plaquenil  Advised to stop krill oil  Call the office with an update from rheumatology  Diagnoses and all orders for this visit:    Easy bruising          Subjective:      Patient ID: Daiana Gutierrez is a 61 y o  female  Here for bruising   Noticed it over the last few weeks  Bruising on legs and arms  Doesn't know how she got them  She has been on a baby aspirin for years  She did just start taking krill oil a few weeks ago and started on plaquenil 2 months ago for RA   She follows with rheumatology  She is currently in a crohn's exacerbation with increase blood in stools (which is not unusual for her during an exacerbation)         The following portions of the patient's history were reviewed and updated as appropriate: allergies, current medications, past family history, past medical history, past social history, past surgical history and problem list     Review of Systems   Constitutional: Negative  Respiratory: Negative  Cardiovascular: Negative  Gastrointestinal: Positive for blood in stool  Genitourinary: Positive for hematuria  Musculoskeletal: Positive for arthralgias  Hematological: Bruises/bleeds easily  Objective:      /84 (BP Location: Left arm, Patient Position: Sitting, Cuff Size: Adult)   Pulse 101   Ht 5' 5" (1 651 m)   Wt 61 2 kg (135 lb)   SpO2 98%   BMI 22 47 kg/m²          Physical Exam   Constitutional: She is oriented to person, place, and time  She appears well-developed and well-nourished  Cardiovascular: Normal rate and regular rhythm  Pulmonary/Chest: Effort normal and breath sounds normal    Abdominal: Soft  Bowel sounds are normal    Musculoskeletal: She exhibits no edema     Neurological: She is alert and oriented to person, place, and time  Skin:   Ecchymotic areas on bilateral calf and shins  Ecchymotic areas on abdomen from injections and left upper arm    Psychiatric: She has a normal mood and affect  Her behavior is normal    Vitals reviewed

## 2018-08-02 ENCOUNTER — TELEPHONE (OUTPATIENT)
Dept: INTERNAL MEDICINE CLINIC | Facility: CLINIC | Age: 58
End: 2018-08-02

## 2018-08-02 NOTE — TELEPHONE ENCOUNTER
Patient is calling in to let you know that her platelet count is much better then previous  She will be seeing her   rheumatologist on the 16th of this month

## 2018-08-22 ENCOUNTER — TELEPHONE (OUTPATIENT)
Dept: INTERNAL MEDICINE CLINIC | Facility: CLINIC | Age: 58
End: 2018-08-22

## 2018-08-22 NOTE — TELEPHONE ENCOUNTER
Patient called in asking if we received prioauthorization request for provigil - if so has it been started or what is the status?  Please advise as patient would like a phone call to know the update

## 2018-08-24 NOTE — TELEPHONE ENCOUNTER
Im having trouble getting this prior auth to go through  I spoke with patient and asked for her to call back with insurance card information on her [facility] Rx plan  We have nothing on file

## 2018-08-27 NOTE — TELEPHONE ENCOUNTER
I was able to speak with patient and got the correct information  I will f/u with her once I get an answer from insurance company

## 2018-09-27 ENCOUNTER — TELEPHONE (OUTPATIENT)
Dept: INTERNAL MEDICINE CLINIC | Facility: CLINIC | Age: 58
End: 2018-09-27

## 2018-09-27 DIAGNOSIS — F41.9 ANXIETY: Primary | ICD-10-CM

## 2018-09-27 RX ORDER — CLONAZEPAM 1 MG/1
1 TABLET ORAL
Qty: 30 TABLET | Refills: 0 | Status: SHIPPED | OUTPATIENT
Start: 2018-09-27 | End: 2018-10-31 | Stop reason: SDUPTHER

## 2018-09-27 NOTE — TELEPHONE ENCOUNTER
Spoke with patient  Provigil denied for chronic fatige  She will appeal this  We discussed switching to clonazepam from lorazepam to help her sleep better at night  She declined treatment for periodic limb movement disorder in the past but I think this is a good option for her and there is no specific treatment for chronic fatigue  Stop lorazepam and try clonazepam for sleep

## 2018-10-10 ENCOUNTER — TELEPHONE (OUTPATIENT)
Dept: INTERNAL MEDICINE CLINIC | Facility: CLINIC | Age: 58
End: 2018-10-10

## 2018-10-10 NOTE — TELEPHONE ENCOUNTER
Chronic fatigue is the main diagnosis for this medication for her   She also has periodic limb movement disorder interfering with sleep, rheumatoid arthritis, Crohns disease

## 2018-10-31 DIAGNOSIS — F41.9 ANXIETY: ICD-10-CM

## 2018-11-02 RX ORDER — CLONAZEPAM 1 MG/1
1 TABLET ORAL
Qty: 30 TABLET | Refills: 0 | Status: SHIPPED | OUTPATIENT
Start: 2018-11-02 | End: 2019-05-04 | Stop reason: SDUPTHER

## 2018-11-17 DIAGNOSIS — M79.7 FIBROMYALGIA: Primary | ICD-10-CM

## 2018-11-17 RX ORDER — DULOXETIN HYDROCHLORIDE 60 MG/1
CAPSULE, DELAYED RELEASE ORAL
Qty: 90 CAPSULE | Refills: 3 | Status: SHIPPED | OUTPATIENT
Start: 2018-11-17 | End: 2019-01-14 | Stop reason: SDUPTHER

## 2019-01-04 DIAGNOSIS — E53.8 B12 DEFICIENCY: Primary | ICD-10-CM

## 2019-01-04 RX ORDER — SYRINGE WITH NEEDLE, 1 ML 25GX5/8"
SYRINGE, EMPTY DISPOSABLE MISCELLANEOUS
Qty: 100 EACH | Refills: 0 | Status: SHIPPED | OUTPATIENT
Start: 2019-01-04 | End: 2020-01-24

## 2019-01-14 ENCOUNTER — OFFICE VISIT (OUTPATIENT)
Dept: INTERNAL MEDICINE CLINIC | Facility: CLINIC | Age: 59
End: 2019-01-14
Payer: MEDICARE

## 2019-01-14 VITALS
SYSTOLIC BLOOD PRESSURE: 112 MMHG | OXYGEN SATURATION: 99 % | DIASTOLIC BLOOD PRESSURE: 78 MMHG | RESPIRATION RATE: 16 BRPM | HEIGHT: 65 IN | HEART RATE: 71 BPM | WEIGHT: 137.8 LBS | BODY MASS INDEX: 22.96 KG/M2

## 2019-01-14 DIAGNOSIS — Z12.39 SCREENING FOR BREAST CANCER: Primary | ICD-10-CM

## 2019-01-14 DIAGNOSIS — M79.7 FIBROMYALGIA: ICD-10-CM

## 2019-01-14 DIAGNOSIS — E53.8 B12 DEFICIENCY: ICD-10-CM

## 2019-01-14 DIAGNOSIS — E78.1 ESSENTIAL HYPERTRIGLYCERIDEMIA: ICD-10-CM

## 2019-01-14 DIAGNOSIS — M05.79 RHEUMATOID ARTHRITIS INVOLVING MULTIPLE SITES WITH POSITIVE RHEUMATOID FACTOR (HCC): ICD-10-CM

## 2019-01-14 DIAGNOSIS — K50.90 CROHN'S DISEASE WITHOUT COMPLICATION, UNSPECIFIED GASTROINTESTINAL TRACT LOCATION (HCC): ICD-10-CM

## 2019-01-14 DIAGNOSIS — N95.1 SYMPTOMATIC MENOPAUSAL OR FEMALE CLIMACTERIC STATES: ICD-10-CM

## 2019-01-14 DIAGNOSIS — I48.91 ATRIAL FIBRILLATION, UNSPECIFIED TYPE (HCC): ICD-10-CM

## 2019-01-14 DIAGNOSIS — R53.82 CHRONIC FATIGUE SYNDROME: ICD-10-CM

## 2019-01-14 DIAGNOSIS — G47.61 PLMD (PERIODIC LIMB MOVEMENT DISORDER): ICD-10-CM

## 2019-01-14 PROCEDURE — 99214 OFFICE O/P EST MOD 30 MIN: CPT | Performed by: INTERNAL MEDICINE

## 2019-01-14 RX ORDER — ESTRADIOL 0.5 MG/.5G
1 GEL TOPICAL DAILY
Refills: 0
Start: 2019-01-14 | End: 2020-01-07 | Stop reason: ALTCHOICE

## 2019-01-14 RX ORDER — CYANOCOBALAMIN 1000 UG/ML
1000 INJECTION INTRAMUSCULAR; SUBCUTANEOUS
Qty: 30 ML | Refills: 3 | Status: SHIPPED | OUTPATIENT
Start: 2019-01-14 | End: 2019-06-30 | Stop reason: SDUPTHER

## 2019-01-14 RX ORDER — DULOXETIN HYDROCHLORIDE 20 MG/1
CAPSULE, DELAYED RELEASE ORAL
Qty: 30 CAPSULE | Refills: 5 | Status: SHIPPED | OUTPATIENT
Start: 2019-01-14 | End: 2019-04-18 | Stop reason: SDUPTHER

## 2019-01-14 NOTE — ASSESSMENT & PLAN NOTE
On prednisone and plaquenil  She does not want to take any more biologic agents  She will continue to see her rheumatologist

## 2019-01-14 NOTE — PROGRESS NOTES
Assessment/Plan:    Crohn disease ([place] [address] )  F/U with GI  She will schedule with colorectal surgery as well    Atrial fibrillation ([place] [address] )  She has not taken metoprolol in a few months  HR has been normal when she checks with her phone    Rheumatoid arthritis ([place] [address] )  On prednisone and plaquenil  She does not want to take any more biologic agents  She will continue to see her rheumatologist    PLMD (periodic limb movement disorder)  Sleep study ordered  ?improvement with clonazepam  Check ferritin    Chronic fatigue syndrome  Slightly better off MTX  (stop folic acid now that she is off MTX)  This is monitored by her rheumatologist  She would like to try a lower dose of Cymbalta  She has 30mg from her rheumatologist so will switch to 50mg (20+30mg)    Waiting to get Shingrix       Problem List Items Addressed This Visit        Digestive    Crohn disease ([place] [address] )     F/U with GI  She will schedule with colorectal surgery as well         Relevant Orders    Ambulatory referral to Colorectal Surgery       Cardiovascular and Mediastinum    Atrial fibrillation ([place] [address] )     She has not taken metoprolol in a few months  HR has been normal when she checks with her phone            Nervous and Auditory    Chronic fatigue syndrome     Slightly better off MTX  (stop folic acid now that she is off MTX)  This is monitored by her rheumatologist  She would like to try a lower dose of Cymbalta     She has 30mg from her rheumatologist so will switch to 50mg (20+30mg)              Musculoskeletal and Integument    Rheumatoid arthritis ([facility])     On prednisone and plaquenil  She does not want to take any more biologic agents  She will continue to see her rheumatologist            Other    Essential hypertriglyceridemia    Relevant Orders    Lipid panel    PLMD (periodic limb movement disorder)     Sleep study ordered  ?improvement with clonazepam  Check ferritin         Relevant Orders    Ferritin    CPAP Study    Symptomatic menopausal or female climacteric states    Relevant Medications    DIVIGEL 0 5 MG/0 5GM GEL      Other Visit Diagnoses     Screening for breast cancer    -  Primary    Relevant Orders    Mammo screening bilateral w cad    B12 deficiency        Relevant Medications    cyanocobalamin 1,000 mcg/mL    Fibromyalgia        Relevant Medications    DULoxetine (CYMBALTA) 20 mg capsule            Subjective:      Patient ID: Leticia Stewart is a 60 y o  female  HPI   Chronic fatigue, RA, Crohns, PLMD  We switched her to clonazepam to improve her sleep at night which has helped  She would like her ferritin checked and a f/u sleep study  Also stopped MTX about 2 months ago and feels better Still tired but not exhausted  She is only on Plaquenil and prednisone 8mg (7-10mg)  Continues to see her rheumatologist  Crohns-colonoscopy normal last year but MRI enterography showed ileitis  Stelara offered but she declined    She does not want to take any more biologic agents  She is following up with GI      The following portions of the patient's history were reviewed and updated as appropriate: allergies, current medications, past family history, past medical history, past surgical history and problem list     Review of Systems   Constitutional: Positive for fatigue  Negative for fever and unexpected weight change  HENT: Positive for rhinorrhea  Negative for ear pain, hearing loss, sinus pain, sinus pressure and sore throat  Respiratory: Negative for cough, shortness of breath and wheezing  Cardiovascular: Negative for chest pain, palpitations and leg swelling  Gastrointestinal: Positive for abdominal pain, blood in stool and diarrhea  Negative for constipation, nausea and vomiting  Musculoskeletal: Positive for arthralgias and myalgias  Psychiatric/Behavioral: Positive for sleep disturbance           Objective:      /78 (BP Location: Left arm, Patient Position: Sitting, Cuff Size: Standard)   Pulse 71   Resp 16   Ht 5' 5" (6 311 m)   Wt 62 5 kg (137 lb 12 8 oz)   SpO2 99%   BMI 22 93 kg/m²          Physical Exam   Constitutional: She is oriented to person, place, and time  She appears well-developed and well-nourished  HENT:   Head: Normocephalic and atraumatic  Right Ear: External ear normal    Left Ear: External ear normal    Mouth/Throat: Oropharynx is clear and moist    Eyes: Conjunctivae are normal    Neck: Neck supple  Cardiovascular: Normal rate, regular rhythm and normal heart sounds  No murmur heard  Pulmonary/Chest: Effort normal and breath sounds normal  No respiratory distress  She has no wheezes  She has no rales  Abdominal: Soft  Bowel sounds are normal  She exhibits no distension and no mass  There is no tenderness  There is no rebound and no guarding  Musculoskeletal: Normal range of motion  Neurological: She is alert and oriented to person, place, and time  Skin: Skin is warm and dry  Psychiatric: She has a normal mood and affect   Her behavior is normal  Judgment and thought content normal

## 2019-01-14 NOTE — ASSESSMENT & PLAN NOTE
Slightly better off MTX  (stop folic acid now that she is off MTX)  This is monitored by her rheumatologist  She would like to try a lower dose of Cymbalta     She has 30mg from her rheumatologist so will switch to 50mg (20+30mg)

## 2019-04-18 DIAGNOSIS — M79.7 FIBROMYALGIA: ICD-10-CM

## 2019-04-18 RX ORDER — DULOXETIN HYDROCHLORIDE 20 MG/1
CAPSULE, DELAYED RELEASE ORAL
Qty: 30 CAPSULE | Refills: 5 | Status: SHIPPED | OUTPATIENT
Start: 2019-04-18 | End: 2020-03-30

## 2019-05-04 DIAGNOSIS — M79.7 FIBROMYALGIA: ICD-10-CM

## 2019-05-04 DIAGNOSIS — F41.9 ANXIETY: ICD-10-CM

## 2019-05-05 RX ORDER — DULOXETIN HYDROCHLORIDE 20 MG/1
20 CAPSULE, DELAYED RELEASE ORAL DAILY
Qty: 30 CAPSULE | Refills: 5 | Status: SHIPPED | OUTPATIENT
Start: 2019-05-05 | End: 2020-01-07

## 2019-05-05 RX ORDER — CLONAZEPAM 1 MG/1
1 TABLET ORAL
Qty: 30 TABLET | Refills: 0 | Status: SHIPPED | OUTPATIENT
Start: 2019-05-05 | End: 2019-06-10 | Stop reason: SDUPTHER

## 2019-05-29 LAB — HCV AB SER-ACNC: NEGATIVE

## 2019-06-10 DIAGNOSIS — F41.9 ANXIETY: ICD-10-CM

## 2019-06-12 RX ORDER — CLONAZEPAM 1 MG/1
1 TABLET ORAL
Qty: 30 TABLET | Refills: 2 | Status: SHIPPED | OUTPATIENT
Start: 2019-06-12 | End: 2019-09-08 | Stop reason: SDUPTHER

## 2019-06-12 NOTE — TELEPHONE ENCOUNTER
PDMP CHECKED  LAST FILL: 5/7  QUANT: 30    Prescriptions   Filled  ID  Written  Drug  QTY  Days  Prescriber  Rx #  Pharmacy *  Refills  Daily Dose  Pymt Type      05/07/2019  1  05/05/2019  CLONAZEPAM 1 MG TABLET  30 0  30  [facility]  [id number]  PENNS ([id number])  0   Private Pay  [place]        PT COMES UP ON PDMP WITH THIS INFORMATION:    Report Criteria   First Mick Ferrera   Last Winsome Barclay   [id number]

## 2019-06-28 DIAGNOSIS — E53.8 B12 DEFICIENCY: ICD-10-CM

## 2019-06-28 RX ORDER — CYANOCOBALAMIN 1000 UG/ML
1000 INJECTION INTRAMUSCULAR; SUBCUTANEOUS
Qty: 6 ML | Refills: 8 | OUTPATIENT
Start: 2019-06-28

## 2019-06-30 DIAGNOSIS — E53.8 B12 DEFICIENCY: ICD-10-CM

## 2019-06-30 RX ORDER — CYANOCOBALAMIN 1000 UG/ML
1000 INJECTION INTRAMUSCULAR; SUBCUTANEOUS
Qty: 6 ML | Refills: 3 | Status: SHIPPED | OUTPATIENT
Start: 2019-06-30 | End: 2019-08-20 | Stop reason: SDUPTHER

## 2019-08-20 ENCOUNTER — OFFICE VISIT (OUTPATIENT)
Dept: INTERNAL MEDICINE CLINIC | Facility: CLINIC | Age: 59
End: 2019-08-20
Payer: MEDICARE

## 2019-08-20 VITALS
HEART RATE: 95 BPM | WEIGHT: 142 LBS | OXYGEN SATURATION: 96 % | BODY MASS INDEX: 23.66 KG/M2 | SYSTOLIC BLOOD PRESSURE: 118 MMHG | HEIGHT: 65 IN | DIASTOLIC BLOOD PRESSURE: 68 MMHG

## 2019-08-20 DIAGNOSIS — E78.1 ESSENTIAL HYPERTRIGLYCERIDEMIA: ICD-10-CM

## 2019-08-20 DIAGNOSIS — E53.8 B12 DEFICIENCY: ICD-10-CM

## 2019-08-20 DIAGNOSIS — M05.79 RHEUMATOID ARTHRITIS INVOLVING MULTIPLE SITES WITH POSITIVE RHEUMATOID FACTOR (HCC): ICD-10-CM

## 2019-08-20 DIAGNOSIS — Z01.84 LACK OF IMMUNITY TO HEPATITIS B VIRUS DEMONSTRATED BY SEROLOGIC TEST: ICD-10-CM

## 2019-08-20 DIAGNOSIS — Z86.19 HISTORY OF SHINGLES: ICD-10-CM

## 2019-08-20 DIAGNOSIS — K50.90 CROHN'S DISEASE WITHOUT COMPLICATION, UNSPECIFIED GASTROINTESTINAL TRACT LOCATION (HCC): ICD-10-CM

## 2019-08-20 DIAGNOSIS — Z23 ENCOUNTER FOR IMMUNIZATION: ICD-10-CM

## 2019-08-20 DIAGNOSIS — R53.82 CHRONIC FATIGUE SYNDROME: Primary | ICD-10-CM

## 2019-08-20 DIAGNOSIS — N95.1 SYMPTOMATIC MENOPAUSAL OR FEMALE CLIMACTERIC STATES: ICD-10-CM

## 2019-08-20 PROBLEM — I48.91 ATRIAL FIBRILLATION (HCC): Status: RESOLVED | Noted: 2017-10-20 | Resolved: 2019-08-20

## 2019-08-20 PROCEDURE — 90746 HEPB VACCINE 3 DOSE ADULT IM: CPT

## 2019-08-20 PROCEDURE — G0010 ADMIN HEPATITIS B VACCINE: HCPCS

## 2019-08-20 PROCEDURE — 99215 OFFICE O/P EST HI 40 MIN: CPT | Performed by: INTERNAL MEDICINE

## 2019-08-20 RX ORDER — VALACYCLOVIR HYDROCHLORIDE 1 G/1
1000 TABLET, FILM COATED ORAL 2 TIMES DAILY
Qty: 30 TABLET | Refills: 0 | Status: SHIPPED | OUTPATIENT
Start: 2019-08-20 | End: 2020-04-28

## 2019-08-20 RX ORDER — CYANOCOBALAMIN 1000 UG/ML
1000 INJECTION INTRAMUSCULAR; SUBCUTANEOUS
Qty: 12 ML | Refills: 1 | Status: SHIPPED | OUTPATIENT
Start: 2019-08-20 | End: 2020-09-16

## 2019-08-20 NOTE — PROGRESS NOTES
Assessment/Plan:  Hep B , 1st of 3 today  Obtain labs  F/U with rheum, GI  Awaiting sleep study       Problem List Items Addressed This Visit        Digestive    Crohn disease ([facility] )     Relatively stable  On Cimzia for RA         Relevant Medications    colestipol (COLESTID) 1 g tablet    Other Relevant Orders    TSH, 3rd generation with Free T4 reflex    Ferritin    Vitamin D 25 hydroxy    Vitamin B12       Nervous and Auditory    Chronic fatigue syndrome - Primary     Related to poor sleep? Getting sleep study         Relevant Orders    TSH, 3rd generation with Free T4 reflex    Ferritin    Vitamin D 25 hydroxy    Vitamin B12       Musculoskeletal and Integument    Rheumatoid arthritis ([facility] )     Not controlled  On MTX, Cimzia and a steroid taper         Relevant Medications    Methotrexate, PF, 25 MG/0 5ML SOAJ    Other Relevant Orders    TSH, 3rd generation with Free T4 reflex    Ferritin    Vitamin D 25 hydroxy    Vitamin B12       Other    Symptomatic menopausal or female climacteric states    Essential hypertriglyceridemia     Related to diet  Cannot eat a high fiber diet-Crohns gets worse         Relevant Medications    colestipol (COLESTID) 1 g tablet      Other Visit Diagnoses     Lack of immunity to hepatitis B virus demonstrated by serologic test        Relevant Orders    HEPATITIS B VACCINE ADULT IM (Completed)    Encounter for immunization         Relevant Orders    HEPATITIS B VACCINE ADULT IM (Completed)    B12 deficiency        Relevant Medications    cyanocobalamin 1,000 mcg/mL    History of shingles        Relevant Medications    valACYclovir (VALTREX) 1,000 mg tablet            Subjective:      Patient ID: Padmini Hester is a 60 y o  female  HPI  Here for a follow up  Doing poorly  RA on Cimzia but joint aches, swelling remain uncontrolled  She is still on MTX SQ and weaning off prednisone     Crohns with chronic diarrhea, better on Colestipol  Hepatitis panel showed that she is not immune to Hep B, needs vaccine  Chronic fatigue and is scheduled for a sleep study  Possible periodic limb movement disorder? H/o shingles on the left face/eye and has been experiencing some tingling in the same area  Saw cardiology-no Afib (only transient when she was ill in hospital 2 years ago), no longer on a BB  Daughter back in [place] and son going to [place] this fall  She is planning to move to [facility] in a year  The following portions of the patient's history were reviewed and updated as appropriate: allergies, current medications, past family history, past social history, past surgical history and problem list     Review of Systems   Constitutional: Positive for fatigue  Negative for chills, fever and unexpected weight change  HENT: Negative for sinus pressure, sinus pain and sore throat  Respiratory: Negative for cough, shortness of breath and wheezing  Cardiovascular: Negative for chest pain, palpitations and leg swelling  Gastrointestinal: Positive for abdominal pain and diarrhea  Negative for blood in stool, constipation, nausea and vomiting  Endocrine:        Hot flashes   Genitourinary: Negative for difficulty urinating  Musculoskeletal: Positive for arthralgias and joint swelling  Skin: Negative for rash  Neurological: Negative for dizziness and headaches  Psychiatric/Behavioral: Positive for sleep disturbance  Objective:      /68   Pulse 95   Ht 5' 5" (1 651 m)   Wt 64 4 kg (142 lb)   SpO2 96%   BMI 23 63 kg/m²          Physical Exam   Constitutional: She is oriented to person, place, and time  She appears well-developed and well-nourished  HENT:   Head: Normocephalic and atraumatic  Right Ear: External ear normal    Left Ear: External ear normal    Mouth/Throat: Oropharynx is clear and moist    Eyes: Conjunctivae are normal    Neck: Neck supple  Cardiovascular: Normal rate, regular rhythm and normal heart sounds  No murmur heard    Pulmonary/Chest: Effort normal and breath sounds normal  No respiratory distress  She has no wheezes  She has no rales  Abdominal: Soft  She exhibits no distension and no mass  There is tenderness (diffuse, mild)  There is no rebound and no guarding  Musculoskeletal: Normal range of motion  She exhibits edema (fingers)  Neurological: She is alert and oriented to person, place, and time  Skin: Skin is warm and dry  No rash noted  Psychiatric: She has a normal mood and affect   Her behavior is normal  Judgment and thought content normal

## 2019-08-21 RX ORDER — MONTELUKAST SODIUM 4 MG/1
4 TABLET, CHEWABLE ORAL DAILY
Start: 2019-08-21

## 2019-09-08 DIAGNOSIS — F41.9 ANXIETY: ICD-10-CM

## 2019-09-09 RX ORDER — CLONAZEPAM 1 MG/1
1 TABLET ORAL
Qty: 30 TABLET | Refills: 0 | Status: SHIPPED | OUTPATIENT
Start: 2019-09-09 | End: 2019-10-10 | Stop reason: SDUPTHER

## 2019-09-20 ENCOUNTER — CLINICAL SUPPORT (OUTPATIENT)
Dept: INTERNAL MEDICINE CLINIC | Facility: CLINIC | Age: 59
End: 2019-09-20
Payer: MEDICARE

## 2019-09-20 DIAGNOSIS — Z23 NEED FOR HEPATITIS B VACCINATION: Primary | ICD-10-CM

## 2019-09-20 PROCEDURE — G0010 ADMIN HEPATITIS B VACCINE: HCPCS

## 2019-09-20 PROCEDURE — 90746 HEPB VACCINE 3 DOSE ADULT IM: CPT

## 2019-10-04 ENCOUNTER — OFFICE VISIT (OUTPATIENT)
Dept: INTERNAL MEDICINE CLINIC | Facility: CLINIC | Age: 59
End: 2019-10-04
Payer: MEDICARE

## 2019-10-04 VITALS
OXYGEN SATURATION: 96 % | WEIGHT: 144.2 LBS | SYSTOLIC BLOOD PRESSURE: 118 MMHG | TEMPERATURE: 99 F | HEIGHT: 65 IN | DIASTOLIC BLOOD PRESSURE: 62 MMHG | BODY MASS INDEX: 24.03 KG/M2 | HEART RATE: 72 BPM

## 2019-10-04 DIAGNOSIS — L03.113 CELLULITIS OF RIGHT UPPER EXTREMITY: Primary | ICD-10-CM

## 2019-10-04 PROCEDURE — 99213 OFFICE O/P EST LOW 20 MIN: CPT | Performed by: INTERNAL MEDICINE

## 2019-10-04 RX ORDER — DOXYCYCLINE HYCLATE 100 MG
100 TABLET ORAL 2 TIMES DAILY
Qty: 28 TABLET | Refills: 0 | Status: SHIPPED | OUTPATIENT
Start: 2019-10-04 | End: 2019-10-07 | Stop reason: ALTCHOICE

## 2019-10-04 NOTE — PROGRESS NOTES
Assessment/Plan:  Start doxycycline x 2 weeks  Lyme serology in 3 weeks     Problem List Items Addressed This Visit     None      Visit Diagnoses     Cellulitis of right upper extremity    -  Primary    Relevant Medications    doxycycline hyclate (VIBRA-TABS) 100 mg tablet    Other Relevant Orders    Lyme Antibody Profile with reflex to WB            Subjective:      Patient ID: Fredy Jimenez is a 60 y o  female  HPI  6 days ago, noticed a small pinkish patch on the right upper arm   She was in [place] visiting her son in college  It has grown since  +low grade fever  No other new symptoms  +chronic fatigue, arthralgias    The following portions of the patient's history were reviewed and updated as appropriate: allergies, past family history, past medical history, past social history, past surgical history and problem list     Review of Systems   Constitutional: Positive for fatigue and fever  Musculoskeletal: Positive for arthralgias  Skin: Positive for rash  Objective:      /62   Pulse 72   Temp 99 °F (37 2 °C)   Ht 5' 5" (1 651 m)   Wt 65 4 kg (144 lb 3 2 oz)   SpO2 96%   BMI 24 00 kg/m²          Physical Exam   Constitutional: No distress  Cardiovascular: Normal rate, regular rhythm and normal heart sounds  No murmur heard  Pulmonary/Chest: Effort normal and breath sounds normal  No stridor  No respiratory distress  She has no wheezes  She has no rales  Skin: She is not diaphoretic  There is erythema (erythematous patch on the right upper arm with a slightly raised center)

## 2019-10-07 ENCOUNTER — TELEPHONE (OUTPATIENT)
Dept: INTERNAL MEDICINE CLINIC | Facility: CLINIC | Age: 59
End: 2019-10-07

## 2019-10-07 ENCOUNTER — OFFICE VISIT (OUTPATIENT)
Dept: INTERNAL MEDICINE CLINIC | Facility: CLINIC | Age: 59
End: 2019-10-07
Payer: MEDICARE

## 2019-10-07 VITALS
DIASTOLIC BLOOD PRESSURE: 70 MMHG | BODY MASS INDEX: 24.22 KG/M2 | SYSTOLIC BLOOD PRESSURE: 134 MMHG | WEIGHT: 145.4 LBS | HEART RATE: 78 BPM | OXYGEN SATURATION: 95 % | HEIGHT: 65 IN | TEMPERATURE: 97.9 F

## 2019-10-07 DIAGNOSIS — M05.79 RHEUMATOID ARTHRITIS INVOLVING MULTIPLE SITES WITH POSITIVE RHEUMATOID FACTOR (HCC): ICD-10-CM

## 2019-10-07 DIAGNOSIS — K50.90 CROHN'S DISEASE WITHOUT COMPLICATION, UNSPECIFIED GASTROINTESTINAL TRACT LOCATION (HCC): ICD-10-CM

## 2019-10-07 DIAGNOSIS — R68.83 CHILLS (WITHOUT FEVER): ICD-10-CM

## 2019-10-07 DIAGNOSIS — L03.113 CELLULITIS OF RIGHT UPPER EXTREMITY: Primary | ICD-10-CM

## 2019-10-07 DIAGNOSIS — R53.83 FATIGUE, UNSPECIFIED TYPE: ICD-10-CM

## 2019-10-07 PROCEDURE — 99214 OFFICE O/P EST MOD 30 MIN: CPT | Performed by: INTERNAL MEDICINE

## 2019-10-07 RX ORDER — CEPHALEXIN 500 MG/1
500 CAPSULE ORAL EVERY 8 HOURS SCHEDULED
Qty: 30 CAPSULE | Refills: 0 | Status: SHIPPED | OUTPATIENT
Start: 2019-10-07 | End: 2019-10-17

## 2019-10-07 RX ORDER — DOXYCYCLINE HYCLATE 100 MG
100 TABLET ORAL 2 TIMES DAILY
Qty: 28 TABLET | Refills: 0
Start: 2019-10-07 | End: 2019-10-15 | Stop reason: SDUPTHER

## 2019-10-07 RX ORDER — CEPHALEXIN 500 MG/1
500 CAPSULE ORAL EVERY 8 HOURS SCHEDULED
Qty: 30 CAPSULE | Refills: 0 | Status: SHIPPED | OUTPATIENT
Start: 2019-10-07 | End: 2019-10-07 | Stop reason: SDUPTHER

## 2019-10-07 NOTE — TELEPHONE ENCOUNTER
Any fever, chills? If none and  she is able to tolerate the antibiotics, I would like her to switch to the Keflex and see what her rheum says tomorrow

## 2019-10-07 NOTE — PROGRESS NOTES
Assessment/Plan:  Should not worsen with doxy if this is truly Lyme  Continue Doxy and start Keflex for wider bacterial coverage  Obtain blood work tomorrow and f/u with rheum as scheduled tomorrow  Possibly see derm for the rash     Problem List Items Addressed This Visit        Digestive    Crohn disease ([facility] )       Musculoskeletal and Integument    Rheumatoid arthritis ([facility] )      Other Visit Diagnoses     Cellulitis of right upper extremity    -  Primary    Relevant Medications    cephalexin (KEFLEX) 500 mg capsule    doxycycline hyclate (VIBRA-TABS) 100 mg tablet    Other Relevant Orders    CBC and differential    C-reactive protein    Sedimentation rate, automated    Comprehensive metabolic panel    Blood culture    Blood culture    Fatigue, unspecified type        Relevant Orders    CBC and differential    C-reactive protein    Sedimentation rate, automated    Comprehensive metabolic panel    Blood culture    Blood culture    Chills (without fever)        Relevant Orders    CBC and differential    C-reactive protein    Sedimentation rate, automated    Comprehensive metabolic panel    Blood culture    Blood culture            Subjective:      Patient ID: Ann Pompa is a 60 y o  female      HPI  She was seen last week for redness on the right upper arm  Started doxycycline and was supposed to get Lyme test in 3 weeks  She called today c/o the rash "spreading" and extending to her chest and neck  Immunosuppressed on MTX, prednisone and until August, Cimzia  Feeling very tired, no fever but with chills, profuse sweats  A few spots on her chest were present last week which she attributed to microblading 2 weeks prior but she now has more of these on the right side of the neck  Also new is swelling and pain in both knees    The following portions of the patient's history were reviewed and updated as appropriate: allergies, current medications, past medical history, past social history, past surgical history and problem list     Review of Systems   Constitutional: Positive for chills and fatigue  HENT: Positive for sore throat  Respiratory: Positive for cough  Gastrointestinal: Negative for nausea and vomiting  Musculoskeletal: Positive for arthralgias and joint swelling  Skin: Positive for rash  Neurological: Positive for headaches (new with Cimzia)  Negative for dizziness  Objective:      /70   Pulse 78   Temp 97 9 °F (36 6 °C)   Ht 5' 5" (1 651 m)   Wt 66 kg (145 lb 6 4 oz)   SpO2 95%   BMI 24 20 kg/m²          Physical Exam   Constitutional: She is oriented to person, place, and time  No distress  Cardiovascular: Normal rate, regular rhythm and normal heart sounds  No murmur heard  Pulmonary/Chest: Effort normal and breath sounds normal  No stridor  No respiratory distress  She has no wheezes  She has no rales  Musculoskeletal: She exhibits edema (right knee effusion) and tenderness (both knees)  Neurological: She is alert and oriented to person, place, and time  Skin: She is not diaphoretic     Light pink patch on the right upper arm (better from last week)  Few papules on the right upper check and right side of the neck

## 2019-10-07 NOTE — TELEPHONE ENCOUNTER
Can she come in at 430 today?  I am afraid she will just be discharged form the ER if I send her straight there

## 2019-10-07 NOTE — TELEPHONE ENCOUNTER
Spoke with pt does not have a thermometer at home but has been having the sweats/feeling like she has a fever but no chills    Please advise on what she should do since experiencing fever symptoms

## 2019-10-07 NOTE — TELEPHONE ENCOUNTER
How much worse is it?  Since it has worsened in spite of the doxycycline I can switch her to Cephalexin/keflex TID x 10days

## 2019-10-07 NOTE — TELEPHONE ENCOUNTER
Pt states 20% worse and now developing a different type of rash on her chest up to her neck - she states she is very Immunosorbent due due to all the medications she has been on - she is seeing rheumatology tomorrow as well    Should she be getting IV antibiotics at this point?  Please advise, ty

## 2019-10-07 NOTE — TELEPHONE ENCOUNTER
Patient is concerned that her rash is still spreading  She is very exhausted  Asking how long the antibiotics will take to start working      Please advise

## 2019-10-09 ENCOUNTER — TELEPHONE (OUTPATIENT)
Dept: INTERNAL MEDICINE CLINIC | Facility: CLINIC | Age: 59
End: 2019-10-09

## 2019-10-09 NOTE — TELEPHONE ENCOUNTER
Pt saw rheumotology yesterday  They changed her meds  He thought the rash might be related to the sulfa drug  She stopped the sulfasalazine  She is continuing with the abx and is having her b/w today  She will be in touch with you after the 21st after the Lyme b/w  Pt said the rash is still present and wants to know how long she can expect the rash to be there

## 2019-10-10 DIAGNOSIS — F41.9 ANXIETY: ICD-10-CM

## 2019-10-10 RX ORDER — CLONAZEPAM 1 MG/1
TABLET ORAL
Qty: 30 TABLET | Refills: 0 | Status: SHIPPED | OUTPATIENT
Start: 2019-10-10 | End: 2019-11-06 | Stop reason: SDUPTHER

## 2019-10-15 DIAGNOSIS — L03.113 CELLULITIS OF RIGHT UPPER EXTREMITY: ICD-10-CM

## 2019-10-15 RX ORDER — DOXYCYCLINE HYCLATE 100 MG
100 TABLET ORAL 2 TIMES DAILY
Qty: 14 TABLET | Refills: 0 | Status: SHIPPED | OUTPATIENT
Start: 2019-10-15 | End: 2019-10-22

## 2019-10-17 ENCOUNTER — TELEPHONE (OUTPATIENT)
Dept: INTERNAL MEDICINE CLINIC | Facility: CLINIC | Age: 59
End: 2019-10-17

## 2019-10-17 NOTE — TELEPHONE ENCOUNTER
The specialists from  usually send a form for the referring doc to complete so call them to fax us the form

## 2019-10-17 NOTE — TELEPHONE ENCOUNTER
Patient called in said you have been treating her for Lyme disease base off a rash she had 2 weeks ago  she was trying to make an appointment with  Infectious disease but they told her that you (our office) would need to contact them with office notes, labs and any documentation for her visit  What information should be sent?       [facility] [phone] Opt: 1  Fax: [phone]    Please advise

## 2019-10-24 ENCOUNTER — TELEPHONE (OUTPATIENT)
Dept: INTERNAL MEDICINE CLINIC | Facility: CLINIC | Age: 59
End: 2019-10-24

## 2019-10-24 NOTE — TELEPHONE ENCOUNTER
I did not get the actual faxed report but looked at Epic and it is normal/negative for Lyme  Did they say if they will still see her or not?

## 2019-10-24 NOTE — TELEPHONE ENCOUNTER
Infectious disease said that the pt's lyme tests are completed and she is asking for the results  I don't see them in her chart so she is going to see if she can get them to us quicker

## 2019-10-25 NOTE — TELEPHONE ENCOUNTER
Rash on the chest is gone, rash on the arm is still red but less than it was before - flash of a red [place] rather than bright red but it is still there, she is feeling a little better   She would like to have the western blot regardless, not sure if she should wait a couple of weeks to do that one she just wants to be certain and the only way the lyme will see her with a positive test

## 2019-10-25 NOTE — TELEPHONE ENCOUNTER
Western blot is not really necessary if the screening antibodies are negative  How is the redness on the right arm and rash on the chest? Did that resolve?

## 2019-10-25 NOTE — TELEPHONE ENCOUNTER
Please call [facility] and check if they can run a Lyme Western blot alone  I cannot see any order on Epic for this  Always a reflex

## 2019-10-25 NOTE — TELEPHONE ENCOUNTER
They will not see her bc she is not positive for lyme    Will they run the full western blot even tho she was negative?

## 2019-10-29 DIAGNOSIS — L03.113 CELLULITIS OF RIGHT UPPER EXTREMITY: ICD-10-CM

## 2019-10-29 RX ORDER — DOXYCYCLINE HYCLATE 100 MG
100 TABLET ORAL 2 TIMES DAILY
Qty: 14 TABLET | Refills: 0 | OUTPATIENT
Start: 2019-10-29 | End: 2019-11-05

## 2019-11-06 DIAGNOSIS — F41.9 ANXIETY: ICD-10-CM

## 2019-11-07 RX ORDER — CLONAZEPAM 1 MG/1
TABLET ORAL
Qty: 30 TABLET | Refills: 0 | Status: SHIPPED | OUTPATIENT
Start: 2019-11-07 | End: 2019-12-05 | Stop reason: SDUPTHER

## 2019-12-05 DIAGNOSIS — F41.9 ANXIETY: ICD-10-CM

## 2019-12-05 RX ORDER — CLONAZEPAM 1 MG/1
1 TABLET ORAL
Qty: 30 TABLET | Refills: 0 | Status: SHIPPED | OUTPATIENT
Start: 2019-12-05 | End: 2020-01-07

## 2019-12-18 ENCOUNTER — DOCUMENTATION (OUTPATIENT)
Dept: INTERNAL MEDICINE CLINIC | Facility: CLINIC | Age: 59
End: 2019-12-18

## 2019-12-18 NOTE — PROGRESS NOTES
We received another copy of the "Verbal Order Verification" form today 12/18 - I printed out previously filled out form and once again mailed it to their location ([address] [place] [address], [address] [place] [address]) as we have tried multiple times to fax it to both numbers listed on the form ([phone], [phone]) with no avail    I have also mailed same form to them last week since we were unable to get the form to go through since November

## 2020-01-06 DIAGNOSIS — F41.9 ANXIETY: ICD-10-CM

## 2020-01-07 ENCOUNTER — OFFICE VISIT (OUTPATIENT)
Dept: INTERNAL MEDICINE CLINIC | Facility: CLINIC | Age: 60
End: 2020-01-07
Payer: MEDICARE

## 2020-01-07 VITALS
HEIGHT: 65 IN | RESPIRATION RATE: 16 BRPM | BODY MASS INDEX: 24.49 KG/M2 | SYSTOLIC BLOOD PRESSURE: 122 MMHG | WEIGHT: 147 LBS | HEART RATE: 81 BPM | TEMPERATURE: 98.5 F | DIASTOLIC BLOOD PRESSURE: 78 MMHG | OXYGEN SATURATION: 96 %

## 2020-01-07 DIAGNOSIS — J06.9 UPPER RESPIRATORY TRACT INFECTION, UNSPECIFIED TYPE: Primary | ICD-10-CM

## 2020-01-07 PROCEDURE — 99213 OFFICE O/P EST LOW 20 MIN: CPT | Performed by: NURSE PRACTITIONER

## 2020-01-07 RX ORDER — METHOTREXATE 25 MG/ML
17.5 INJECTION, SOLUTION INTRA-ARTERIAL; INTRAMUSCULAR; INTRAVENOUS
COMMUNITY
Start: 2019-09-10 | End: 2020-05-13 | Stop reason: ALTCHOICE

## 2020-01-07 RX ORDER — DOXYCYCLINE HYCLATE 100 MG/1
CAPSULE ORAL
COMMUNITY
Start: 2020-01-03 | End: 2020-02-12 | Stop reason: ALTCHOICE

## 2020-01-07 RX ORDER — ASPIRIN 81 MG/1
81 TABLET ORAL
COMMUNITY
End: 2020-01-07 | Stop reason: SDUPTHER

## 2020-01-07 RX ORDER — MEDROXYPROGESTERONE ACETATE 2.5 MG/1
TABLET ORAL
COMMUNITY
Start: 2019-06-12 | End: 2021-12-27

## 2020-01-07 RX ORDER — FOLIC ACID 1 MG/1
2 TABLET ORAL DAILY
COMMUNITY
Start: 2017-07-17

## 2020-01-07 RX ORDER — SYRINGE-NEEDLE,INSULIN,0.5 ML 28GX1/2"
SYRINGE, EMPTY DISPOSABLE MISCELLANEOUS
COMMUNITY
Start: 2019-11-07 | End: 2020-01-07 | Stop reason: SDUPTHER

## 2020-01-07 RX ORDER — PREDNISONE 10 MG/1
9 TABLET ORAL DAILY
COMMUNITY
End: 2021-01-22 | Stop reason: ALTCHOICE

## 2020-01-07 RX ORDER — CLONAZEPAM 1 MG/1
1 TABLET ORAL
Qty: 30 TABLET | Refills: 0 | Status: SHIPPED | OUTPATIENT
Start: 2020-01-07 | End: 2020-01-30

## 2020-01-07 RX ORDER — CYCLOBENZAPRINE HCL 10 MG
TABLET ORAL
COMMUNITY
Start: 2017-11-17

## 2020-01-07 RX ORDER — DULOXETIN HYDROCHLORIDE 30 MG/1
50 CAPSULE, DELAYED RELEASE ORAL
Refills: 2 | COMMUNITY
Start: 2019-10-15 | End: 2021-04-09 | Stop reason: DRUGHIGH

## 2020-01-07 RX ORDER — NEEDLES, SAFETY 22GX1 1/2"
NEEDLE, DISPOSABLE MISCELLANEOUS
Refills: 1 | COMMUNITY
Start: 2019-11-10 | End: 2020-05-20

## 2020-01-07 NOTE — PROGRESS NOTES
Assessment/Plan:    Upper respiratory tract infection  Getting better, finish prednisone and antibiotics  She is taking 1 5 tabs of clonazepam for sleep since she is jittery on prednisone       Diagnoses and all orders for this visit:    Upper respiratory tract infection, unspecified type    Other orders  -     predniSONE 10 mg tablet; Take 10 mg by mouth daily  -     B-D TB SYRINGE 1CC/27GX1/2" 27G X 1/2" 1 ML MISC; USE 1 SYRINGE ONCE A WEEK  -     medroxyPROGESTERone (PROVERA) 2 5 mg tablet; medroxyprogesterone 2 5 mg tablet  -     folic acid (FOLVITE) 1 mg tablet; Take 2 mg by mouth daily  -     DULoxetine (CYMBALTA) 30 mg delayed release capsule; TAKE 1 CAPSULE BY MOUTH DAILY  ALTERNATE WITH THE 60MG EVERY OTHER DAY  -     doxycycline hyclate (VIBRAMYCIN) 100 mg capsule  -     cyclobenzaprine (FLEXERIL) 10 mg tablet; cyclobenzaprine 10 mg tablet  -     VENTOLIN  (90 Base) MCG/ACT inhaler  -     MISCELLANEOUS VAGINAL PRODUCTS VA; Compound Ingredients: estriol 1 mg/ 1g (0 1%); apply to thigh once daily; pump bottle if available, enough for 30 day supply     -     methotrexate 25 MG/ML; Inject 17 5 mg under the skin  -     Discontinue: Cyanocobalamin 1000 MCG/ML KIT; Inject 1,000 mcg into a muscle every 30 (thirty) days  -     Discontinue: Cholecalciferol 50 MCG (2000 UT) TABS; Take 2 capsules by mouth  -     Discontinue: TUBERCULIN SYR 1CC/27GX1/2" 27G X 1/2" 1 ML MISC; USE 1 SYRINGE ONCE A WEEK  -     Discontinue: aspirin (ECOTRIN LOW STRENGTH) 81 mg EC tablet; Take 81 mg by mouth          Subjective:      Patient ID: Marie Mascorro is a 60 y o  female      Last Thursday started with cough that is dry   Wheezing chest tightness  Crackling when lying down  On doxy and prednisone and has 4 more days left    She is taking 1 5 tabs of clonazepam for sleep since prednisone is giving her insomnia      The following portions of the patient's history were reviewed and updated as appropriate: allergies, current medications, past family history, past medical history, past social history, past surgical history and problem list     Review of Systems   Constitutional: Negative  HENT: Positive for congestion  Eyes: Negative  Respiratory: Positive for cough, shortness of breath and wheezing  Cardiovascular: Negative  Gastrointestinal: Negative  Musculoskeletal: Negative  Neurological: Negative  Psychiatric/Behavioral: Positive for sleep disturbance  Objective:      /78   Pulse 81   Temp 98 5 °F (36 9 °C) (Tympanic)   Resp 16   Ht 5' 5" (1 651 m)   Wt 66 7 kg (147 lb)   SpO2 96%   BMI 24 46 kg/m²          Physical Exam   Constitutional: She is oriented to person, place, and time  She appears well-developed and well-nourished  HENT:   Head: Normocephalic and atraumatic  Right Ear: External ear normal    Left Ear: External ear normal    Nose: Nose normal    Mouth/Throat: Oropharynx is clear and moist    Eyes: Pupils are equal, round, and reactive to light  Conjunctivae are normal    Neck: Normal range of motion  Neck supple  Cardiovascular: Normal rate and regular rhythm  Pulmonary/Chest: Effort normal and breath sounds normal    Abdominal: Soft  Bowel sounds are normal    Musculoskeletal: Normal range of motion  Neurological: She is alert and oriented to person, place, and time  Skin: Skin is warm and dry  Nursing note and vitals reviewed

## 2020-01-09 PROBLEM — J06.9 UPPER RESPIRATORY TRACT INFECTION: Status: ACTIVE | Noted: 2020-01-09

## 2020-01-09 NOTE — ASSESSMENT & PLAN NOTE
Getting better, finish prednisone and antibiotics  She is taking 1 5 tabs of clonazepam for sleep since she is jittery on prednisone

## 2020-01-24 DIAGNOSIS — E53.8 B12 DEFICIENCY: ICD-10-CM

## 2020-01-24 RX ORDER — SYRINGE WITH NEEDLE, 1 ML 25GX5/8"
SYRINGE, EMPTY DISPOSABLE MISCELLANEOUS
Qty: 12 EACH | Refills: 0 | Status: SHIPPED | OUTPATIENT
Start: 2020-01-24 | End: 2020-06-23

## 2020-01-30 DIAGNOSIS — F41.9 ANXIETY: ICD-10-CM

## 2020-01-30 RX ORDER — CLONAZEPAM 1 MG/1
1 TABLET ORAL
Qty: 30 TABLET | Refills: 0 | Status: SHIPPED | OUTPATIENT
Start: 2020-01-30 | End: 2020-02-12

## 2020-02-12 ENCOUNTER — OFFICE VISIT (OUTPATIENT)
Dept: INTERNAL MEDICINE CLINIC | Facility: CLINIC | Age: 60
End: 2020-02-12
Payer: MEDICARE

## 2020-02-12 VITALS
BODY MASS INDEX: 24.29 KG/M2 | HEIGHT: 65 IN | DIASTOLIC BLOOD PRESSURE: 78 MMHG | WEIGHT: 145.8 LBS | OXYGEN SATURATION: 98 % | SYSTOLIC BLOOD PRESSURE: 120 MMHG | HEART RATE: 68 BPM | TEMPERATURE: 98.6 F

## 2020-02-12 DIAGNOSIS — G47.61 PLMD (PERIODIC LIMB MOVEMENT DISORDER): ICD-10-CM

## 2020-02-12 DIAGNOSIS — E78.1 ESSENTIAL HYPERTRIGLYCERIDEMIA: ICD-10-CM

## 2020-02-12 DIAGNOSIS — N95.1 SYMPTOMATIC MENOPAUSAL OR FEMALE CLIMACTERIC STATES: ICD-10-CM

## 2020-02-12 DIAGNOSIS — Z00.00 MEDICARE ANNUAL WELLNESS VISIT, SUBSEQUENT: ICD-10-CM

## 2020-02-12 DIAGNOSIS — M05.79 RHEUMATOID ARTHRITIS INVOLVING MULTIPLE SITES WITH POSITIVE RHEUMATOID FACTOR (HCC): ICD-10-CM

## 2020-02-12 DIAGNOSIS — M54.2 NECK PAIN: ICD-10-CM

## 2020-02-12 DIAGNOSIS — K50.90 CROHN'S DISEASE WITHOUT COMPLICATION, UNSPECIFIED GASTROINTESTINAL TRACT LOCATION (HCC): ICD-10-CM

## 2020-02-12 DIAGNOSIS — G35 MULTIPLE SCLEROSIS (HCC): ICD-10-CM

## 2020-02-12 DIAGNOSIS — F41.9 ANXIETY: ICD-10-CM

## 2020-02-12 DIAGNOSIS — R53.82 CHRONIC FATIGUE SYNDROME: Primary | ICD-10-CM

## 2020-02-12 PROBLEM — J06.9 UPPER RESPIRATORY TRACT INFECTION: Status: RESOLVED | Noted: 2020-01-09 | Resolved: 2020-02-12

## 2020-02-12 PROCEDURE — 1036F TOBACCO NON-USER: CPT | Performed by: INTERNAL MEDICINE

## 2020-02-12 PROCEDURE — 99214 OFFICE O/P EST MOD 30 MIN: CPT | Performed by: INTERNAL MEDICINE

## 2020-02-12 PROCEDURE — 3008F BODY MASS INDEX DOCD: CPT | Performed by: INTERNAL MEDICINE

## 2020-02-12 PROCEDURE — G0439 PPPS, SUBSEQ VISIT: HCPCS | Performed by: INTERNAL MEDICINE

## 2020-02-12 RX ORDER — CLONAZEPAM 1 MG/1
2 TABLET ORAL
Start: 2020-02-12 | End: 2020-02-28 | Stop reason: SDUPTHER

## 2020-02-12 NOTE — ASSESSMENT & PLAN NOTE
She is on a compounded medication for this from [facility]   I asked her to call her gyn about a dose increase

## 2020-02-12 NOTE — ASSESSMENT & PLAN NOTE
Active, on prednisone, MTX 17 5mg a week  She will be starting Rituxan for the MS  PT for neck pain  Recent cervical MRI  In January did not reveal subluxation, cord compression

## 2020-02-12 NOTE — PROGRESS NOTES
Assessment/Plan:    Crohn disease ([facility] )  Stable, colonoscopy performed in 2018 and 3 year repeat recommended        Chronic fatigue syndrome  Multifactorial  She was asking about checking EBV but this would not     Multiple sclerosis ([place] [address] )  New diagnosis and she will start Rituxan  She will remain on prednisone    Rheumatoid arthritis (HCC)  Active, on prednisone, MTX 17 5mg a week  She will be starting Rituxan for the MS  PT for neck pain  Recent cervical MRI  In January did not reveal subluxation, cord compression    Essential hypertriglyceridemia  Eats plenty of starchy food because of the Crohns, she cannot tolerate fiber/fruits/vegetables  Obtain lipids-low threshold to start statin because of underlying inflammatory conditions      PLMD (periodic limb movement disorder)  Controlled on clonazepam which was increased to 2mg a day    Symptomatic menopausal or female climacteric states  She is on a compounded medication for this from [facility]   I asked her to call her gyn about a dose increase    I asked her to check with ID if she can get the Hep B sooner if she was to start the Rituxan before the 3rd hep B is due     Problem List Items Addressed This Visit        Digestive    Crohn disease ([place] [address] )     Stable, colonoscopy performed in 2018 and 3 year repeat recommended             Relevant Orders    Vitamin B12    Folate    Vitamin D 25 hydroxy    Ferritin    Magnesium    CBC and differential    Comprehensive metabolic panel    C-reactive protein       Nervous and Auditory    Chronic fatigue syndrome - Primary     Multifactorial  She was asking about checking EBV but this would not          Relevant Orders    TSH, 3rd generation with Free T4 reflex    Multiple sclerosis ([facility] )     New diagnosis and she will start Rituxan  She will remain on prednisone            Musculoskeletal and Integument    Rheumatoid arthritis ([facility] )     Active, on prednisone, MTX 17 5mg a week  She will be starting Rituxan for the MS  PT for neck pain  Recent cervical MRI  In January did not reveal subluxation, cord compression         Relevant Orders    CBC and differential    Comprehensive metabolic panel    C-reactive protein       Other    Essential hypertriglyceridemia     Eats plenty of starchy food because of the Crohns, she cannot tolerate fiber/fruits/vegetables  Obtain lipids-low threshold to start statin because of underlying inflammatory conditions           Relevant Orders    Lipid Panel with Direct LDL reflex    PLMD (periodic limb movement disorder)     Controlled on clonazepam which was increased to 2mg a day         Relevant Medications    clonazePAM (KlonoPIN) 1 mg tablet    Symptomatic menopausal or female climacteric states     She is on a compounded medication for this from [facility]  I asked her to call her gyn about a dose increase           Other Visit Diagnoses     Anxiety        Neck pain        Relevant Orders    Ambulatory referral to Physical Therapy    Medicare annual wellness visit, subsequent                Subjective:      Patient ID: Yamileth Montiel is a 60 y o  female  HPI  Crohns, RA now diagnosed with MS based on LP   She will be starting Rituxan for the MS  She was on it in the past for the RA  Remains on MTX and prednisone 9mg a day  Bartholin cyst on the left removed in November  Recent right DIP index finger surgery recently for OA  Chronic fatigue  Requesting blood work-B12, D, folate, lipids, magnesium and mono  She is on B12 injections folate, mag and D  Third Hep B due in 8 days    The following portions of the patient's history were reviewed and updated as appropriate: allergies, past family history, past medical history, past social history, past surgical history and problem list     Review of Systems   Constitutional: Positive for fatigue  Negative for chills, fever and unexpected weight change     HENT: Negative for congestion, sinus pressure, sinus pain and sore throat  Eyes: Positive for visual disturbance  Respiratory: Negative for cough, shortness of breath and wheezing  Cardiovascular: Positive for palpitations  Negative for chest pain and leg swelling  Gastrointestinal: Positive for abdominal pain, blood in stool, constipation and diarrhea  Negative for nausea and vomiting  Genitourinary: Positive for difficulty urinating (hesitation)  Musculoskeletal: Positive for arthralgias, joint swelling, myalgias and neck pain (requesting PT for stiffness-had a recent MRI)  Neurological: Positive for dizziness  Objective:      /78   Pulse 68   Temp 98 6 °F (37 °C)   Ht 5' 5 2" (1 656 m)   Wt 66 1 kg (145 lb 12 8 oz)   SpO2 98%   BMI 24 11 kg/m²          Physical Exam   Constitutional: She is oriented to person, place, and time  She appears well-developed and well-nourished  HENT:   Head: Normocephalic and atraumatic  Right Ear: External ear normal    Left Ear: External ear normal    Mouth/Throat: Oropharynx is clear and moist    Eyes: Conjunctivae are normal    Neck: Neck supple  Cardiovascular: Normal rate, regular rhythm and normal heart sounds  No murmur heard  Pulmonary/Chest: Effort normal and breath sounds normal  No respiratory distress  She has no wheezes  She has no rales  Abdominal: Soft  Bowel sounds are normal  She exhibits no distension and no mass  There is tenderness (diffuse henrik lower abdomen, )chronic))  There is no rebound and no guarding  Musculoskeletal: She exhibits edema (fingers)  Neurological: She is alert and oriented to person, place, and time  Skin: Skin is warm and dry  Psychiatric: She has a normal mood and affect   Her behavior is normal  Judgment and thought content normal

## 2020-02-12 NOTE — ASSESSMENT & PLAN NOTE
Eats plenty of starchy food because of the Crohns, she cannot tolerate fiber/fruits/vegetables  Obtain lipids-low threshold to start statin because of underlying inflammatory conditions

## 2020-02-12 NOTE — PROGRESS NOTES
Assessment and Plan:     Problem List Items Addressed This Visit     None           Preventive health issues were discussed with patient, and age appropriate screening tests were ordered as noted in patient's After Visit Summary  Personalized health advice and appropriate referrals for health education or preventive services given if needed, as noted in patient's After Visit Summary  History of Present Illness:     Patient presents for Medicare Annual Wellness visit    Patient Care Team:  Abdi Allison MD as PCP - Rosemary Mcgrath MD     Problem List:     Patient Active Problem List   Diagnosis    Crohn disease [facility])    Essential hypertriglyceridemia    [facility]    Bilateral dry eyes    Chronic fatigue syndrome    PLMD (periodic limb movement disorder)    Symptomatic menopausal or female climacteric states    Upper respiratory tract infection      Past Medical and Surgical History:     Past Medical History:   Diagnosis Date    Atrial fibrillation ([facility] ) 10/20/2017    Overview:  10/2017- 1 5 hour run of Afib while in ED w/ abdominal pain  No prior hx  Last Assessment & Plan:  SR today, CHADSVASC=0- taking ASA 81mg  Normal exam today   Concussion with brief LOC     WITH LOC, 30 MIN OR LESS, SEQUELA; LAST ASSESSED: AND RESOLVED 8/21/17    Fatigue     LAST ASSESSED: AND RESOLVED: 6/26/16     No past surgical history on file     Family History:     Family History   Problem Relation Age of Onset    Coronary artery disease Family     Heart disease Family         CARDIAC DISORDER      Social History:        Social History     Socioeconomic History    Marital status:      Spouse name: Not on file    Number of children: 2    Years of education: Not on file    Highest education level: Not on file   Occupational History    Not on file   Social Needs    Financial resource strain: Not on file    Food insecurity:     Worry: Not on file     Inability: Not on file   Ba Transportation needs:     Medical: Not on file     Non-medical: Not on file   Tobacco Use    Smoking status: Former Smoker    Smokeless tobacco: Never Used   Substance and Sexual Activity    Alcohol use: Yes     Comment: social; OCCASIONAL    Drug use: Not on file    Sexual activity: Not on file   Lifestyle    Physical activity:     Days per week: Not on file     Minutes per session: Not on file    Stress: Not on file   Relationships    Social connections:     Talks on phone: Not on file     Gets together: Not on file     Attends Spiritism service: Not on file     Active member of club or organization: Not on file     Attends meetings of clubs or organizations: Not on file     Relationship status: Not on file    Intimate partner violence:     Fear of current or ex partner: Not on file     Emotionally abused: Not on file     Physically abused: Not on file     Forced sexual activity: Not on file   Other Topics Concern    Not on file   Social History Narrative    PETS/ANIMALS: DOG      Medications and Allergies:     Current Outpatient Medications   Medication Sig Dispense Refill    acetaminophen (TYLENOL) 500 mg tablet Take 500 mg by mouth every 6 (six) hours      aspirin 81 MG tablet Take 81 mg by mouth      B-D 3CC LUER-RITESH SYR 23GX1" 23G X 1" 3 ML MISC USE AS DIRECTED  12 each 0    B-D TB SYRINGE 1CC/27GX1/2" 27G X 1/2" 1 ML MISC USE 1 SYRINGE ONCE A WEEK    1    Cholecalciferol 2000 units CAPS Take 2 capsules by mouth        clonazePAM (KlonoPIN) 1 mg tablet TAKE 1 TABLET (1 MG TOTAL) BY MOUTH DAILY AT BEDTIME DO NOT TAKE WITH OXYCODONE ACETAMINOPHEN (Patient taking differently: Take 2 mg by mouth daily at bedtime DO NOT TAKE WITH OXYCODONE ACETAMINOPHEN) 30 tablet 0    colestipol (COLESTID) 1 g tablet Take 4 tablets (4 g total) by mouth daily      cyanocobalamin 1,000 mcg/mL Inject 1 mL (1,000 mcg total) into a muscle every 14 (fourteen) days 12 mL 1    cyclobenzaprine (FLEXERIL) 10 mg tablet cyclobenzaprine 10 mg tablet      DULoxetine (CYMBALTA) 20 mg capsule Take 1 cap daily with 30mg 30 capsule 5    DULoxetine (CYMBALTA) 30 mg delayed release capsule TAKE 1 CAPSULE BY MOUTH DAILY  ALTERNATE WITH THE 60MG EVERY OTHER DAY  2    folic acid (FOLVITE) 1 mg tablet Take 2 mg by mouth daily      Magnesium Gluconate 27 5 MG TABS Take 500 mg by mouth      medroxyPROGESTERone (PROVERA) 2 5 mg tablet medroxyprogesterone 2 5 mg tablet      methotrexate 25 MG/ML Inject 17 5 mg under the skin      MISCELLANEOUS VAGINAL PRODUCTS VA Compound Ingredients: estriol 1 mg/ 1g (0 1%); apply to thigh once daily; pump bottle if available, enough for 30 day supply         predniSONE 10 mg tablet Take 10 mg by mouth daily      valACYclovir (VALTREX) 1,000 mg tablet Take 1 tablet (1,000 mg total) by mouth 2 (two) times a day for 90 days 30 tablet 0    doxycycline hyclate (VIBRAMYCIN) 100 mg capsule       VENTOLIN  (90 Base) MCG/ACT inhaler        No current facility-administered medications for this visit  Allergies   Allergen Reactions    Infliximab Other (See Comments)     Drug induced lupus    Cimzia [Certolizumab Pegol] Other (See Comments)     Joint pain, headache, exhaustion    Abatacept Rash    Sulfasalazine Rash      Immunizations:     Immunization History   Administered Date(s) Administered    Hep B, adult 08/20/2019, 09/20/2019    INFLUENZA 09/12/2017, 10/30/2017    Influenza Quadrivalent Preservative Free 3 years and older IM 10/30/2017    Influenza, injectable, quadrivalent, preservative free 0 5 mL 11/11/2019    Tdap 05/18/2017    Zoster 09/05/2014      Health Maintenance:         Topic Date Due    CRC Screening: Colonoscopy  04/25/2020    MAMMOGRAM  02/13/2021    Cervical Cancer Screening  02/13/2022    Hepatitis C Screening  Completed     There are no preventive care reminders to display for this patient     Medicare Health Risk Assessment:     /78   Pulse 68   Temp 98 6 °F (37 °C)   Ht 5' 5 2" (1 656 m)   Wt 66 1 kg (145 lb 12 8 oz)   SpO2 98%   BMI 24 11 kg/m²      Brianna Anglin is here for her Subsequent Wellness visit  Health Risk Assessment:   Patient rates overall health as poor  Patient feels that their physical health rating is much worse  Eyesight was rated as slightly worse  Hearing was rated as same  Patient feels that their emotional and mental health rating is slightly worse  Pain experienced in the last 7 days has been a lot  Patient's pain rating has been 8/10  Patient states that she has experienced no weight loss or gain in last 6 months  Depression Screening:   PHQ-2 Score: 1      Fall Risk Screening: In the past year, patient has experienced: no history of falling in past year      Urinary Incontinence Screening:   Patient has not leaked urine accidently in the last six months  Home Safety:  Patient does not have trouble with stairs inside or outside of their home  Patient has working smoke alarms and has working carbon monoxide detector  Home safety hazards include: none  Nutrition:   Current diet is Regular  Medications:   Patient is currently taking over-the-counter supplements  OTC medications include: see medication list  Patient is able to manage medications  Activities of Daily Living (ADLs)/Instrumental Activities of Daily Living (IADLs):   Walk and transfer into and out of bed and chair?: Yes  Dress and groom yourself?: Yes    Bathe or shower yourself?: Yes    Feed yourself? Yes  Do your laundry/housekeeping?: Yes  Manage your money, pay your bills and track your expenses?: Yes  Make your own meals?: Yes    Do your own shopping?: Yes    Durable Medical Equipment Suppliers  none    Previous Hospitalizations:   Any hospitalizations or ED visits within the last 12 months?: No      Advance Care Planning:   Living will: Yes    Durable POA for healthcare:  Yes    Advanced directive: Yes      Cognitive Screening:   Provider or family/friend/caregiver concerned regarding cognition?: No    PREVENTIVE SCREENINGS      Cardiovascular Screening:    General: Screening Not Indicated and History Lipid Disorder      Diabetes Screening:     General: Screening Current      Colorectal Cancer Screening:     General: Screening Current      Breast Cancer Screening:     General: Screening Current      Cervical Cancer Screening:    General: Screening Current      Osteoporosis Screening:    General: Screening Current      Abdominal Aortic Aneurysm (AAA) Screening:        General: Screening Not Indicated      Lung Cancer Screening:     General: Screening Not Indicated      Hepatitis C Screening:    General: Screening Current      Maira Us MD

## 2020-02-13 DIAGNOSIS — G47.61 PLMD (PERIODIC LIMB MOVEMENT DISORDER): ICD-10-CM

## 2020-02-13 DIAGNOSIS — R79.0 LOW IRON STORES: Primary | ICD-10-CM

## 2020-02-21 ENCOUNTER — TELEPHONE (OUTPATIENT)
Dept: INTERNAL MEDICINE CLINIC | Facility: CLINIC | Age: 60
End: 2020-02-21

## 2020-02-21 ENCOUNTER — CLINICAL SUPPORT (OUTPATIENT)
Dept: INTERNAL MEDICINE CLINIC | Facility: CLINIC | Age: 60
End: 2020-02-21
Payer: MEDICARE

## 2020-02-21 DIAGNOSIS — E78.1 ESSENTIAL HYPERTRIGLYCERIDEMIA: Primary | ICD-10-CM

## 2020-02-21 DIAGNOSIS — Z23 NEED FOR HEPATITIS B VACCINATION: Primary | ICD-10-CM

## 2020-02-21 PROCEDURE — 90746 HEPB VACCINE 3 DOSE ADULT IM: CPT

## 2020-02-21 PROCEDURE — G0010 ADMIN HEPATITIS B VACCINE: HCPCS

## 2020-02-21 RX ORDER — SIMVASTATIN 5 MG
5 TABLET ORAL
Qty: 30 TABLET | Refills: 3 | Status: SHIPPED | OUTPATIENT
Start: 2020-02-21 | End: 2020-05-13 | Stop reason: SINTOL

## 2020-02-21 NOTE — TELEPHONE ENCOUNTER
I sent simvastatin 5mg to her local pharmacy  It will be too early to check it in May before her visit   So when I see ehr, I will give her the order to repeat the lipid panel then

## 2020-02-28 DIAGNOSIS — G47.61 PLMD (PERIODIC LIMB MOVEMENT DISORDER): ICD-10-CM

## 2020-03-01 RX ORDER — CLONAZEPAM 1 MG/1
2 TABLET ORAL
Qty: 60 TABLET | Refills: 2 | Status: SHIPPED | OUTPATIENT
Start: 2020-03-01 | End: 2020-06-21

## 2020-03-30 DIAGNOSIS — M79.7 FIBROMYALGIA: ICD-10-CM

## 2020-03-30 RX ORDER — DULOXETIN HYDROCHLORIDE 20 MG/1
CAPSULE, DELAYED RELEASE ORAL
Qty: 90 CAPSULE | Refills: 0 | Status: SHIPPED | OUTPATIENT
Start: 2020-03-30 | End: 2021-04-09 | Stop reason: DRUGHIGH

## 2020-04-28 DIAGNOSIS — Z86.19 HISTORY OF SHINGLES: ICD-10-CM

## 2020-04-28 RX ORDER — VALACYCLOVIR HYDROCHLORIDE 1 G/1
1000 TABLET, FILM COATED ORAL 2 TIMES DAILY
Qty: 30 TABLET | Refills: 0 | Status: SHIPPED | OUTPATIENT
Start: 2020-04-28 | End: 2020-05-07

## 2020-05-06 DIAGNOSIS — Z86.19 HISTORY OF SHINGLES: ICD-10-CM

## 2020-05-07 DIAGNOSIS — Z86.19 HISTORY OF SHINGLES: ICD-10-CM

## 2020-05-07 RX ORDER — VALACYCLOVIR HYDROCHLORIDE 1 G/1
1000 TABLET, FILM COATED ORAL 2 TIMES DAILY
Qty: 180 TABLET | Refills: 3 | Status: SHIPPED | OUTPATIENT
Start: 2020-05-07 | End: 2020-05-20

## 2020-05-07 RX ORDER — VALACYCLOVIR HYDROCHLORIDE 1 G/1
TABLET, FILM COATED ORAL
Qty: 180 TABLET | Refills: 1 | Status: SHIPPED | OUTPATIENT
Start: 2020-05-07 | End: 2020-05-07 | Stop reason: SDUPTHER

## 2020-05-13 ENCOUNTER — TELEMEDICINE (OUTPATIENT)
Dept: INTERNAL MEDICINE CLINIC | Facility: CLINIC | Age: 60
End: 2020-05-13
Payer: MEDICARE

## 2020-05-13 VITALS — WEIGHT: 141 LBS | BODY MASS INDEX: 23.49 KG/M2 | HEART RATE: 78 BPM | HEIGHT: 65 IN

## 2020-05-13 DIAGNOSIS — E61.1 IRON DEFICIENCY: Primary | ICD-10-CM

## 2020-05-13 DIAGNOSIS — E55.9 VITAMIN D DEFICIENCY: ICD-10-CM

## 2020-05-13 DIAGNOSIS — G47.61 PLMD (PERIODIC LIMB MOVEMENT DISORDER): ICD-10-CM

## 2020-05-13 DIAGNOSIS — E78.1 ESSENTIAL HYPERTRIGLYCERIDEMIA: ICD-10-CM

## 2020-05-13 DIAGNOSIS — R79.0 LOW IRON STORES: ICD-10-CM

## 2020-05-13 PROCEDURE — 99214 OFFICE O/P EST MOD 30 MIN: CPT | Performed by: INTERNAL MEDICINE

## 2020-05-13 RX ORDER — PRAVASTATIN SODIUM 10 MG
10 TABLET ORAL
Qty: 30 TABLET | Refills: 5 | Status: SHIPPED | OUTPATIENT
Start: 2020-05-13 | End: 2020-10-16

## 2020-05-13 RX ORDER — AZATHIOPRINE 50 MG/1
100 TABLET ORAL DAILY
COMMUNITY
Start: 2020-05-08 | End: 2021-04-09 | Stop reason: ALTCHOICE

## 2020-05-19 ENCOUNTER — TELEPHONE (OUTPATIENT)
Dept: INTERNAL MEDICINE CLINIC | Facility: CLINIC | Age: 60
End: 2020-05-19

## 2020-05-19 RX ORDER — DEXTROAMPHETAMINE SACCHARATE, AMPHETAMINE ASPARTATE, DEXTROAMPHETAMINE SULFATE AND AMPHETAMINE SULFATE 1.25; 1.25; 1.25; 1.25 MG/1; MG/1; MG/1; MG/1
5 TABLET ORAL 2 TIMES DAILY
COMMUNITY
Start: 2020-05-15

## 2020-05-19 RX ORDER — DEXTROAMPHETAMINE SACCHARATE, AMPHETAMINE ASPARTATE, DEXTROAMPHETAMINE SULFATE AND AMPHETAMINE SULFATE 1.25; 1.25; 1.25; 1.25 MG/1; MG/1; MG/1; MG/1
TABLET ORAL
COMMUNITY
Start: 2020-05-15 | End: 2020-05-19 | Stop reason: SDUPTHER

## 2020-05-20 ENCOUNTER — OFFICE VISIT (OUTPATIENT)
Dept: INTERNAL MEDICINE CLINIC | Facility: CLINIC | Age: 60
End: 2020-05-20
Payer: MEDICARE

## 2020-05-20 VITALS
HEIGHT: 65 IN | TEMPERATURE: 97.8 F | HEART RATE: 78 BPM | DIASTOLIC BLOOD PRESSURE: 66 MMHG | BODY MASS INDEX: 23.59 KG/M2 | WEIGHT: 141.6 LBS | OXYGEN SATURATION: 98 % | SYSTOLIC BLOOD PRESSURE: 110 MMHG

## 2020-05-20 DIAGNOSIS — N30.01 ACUTE CYSTITIS WITH HEMATURIA: ICD-10-CM

## 2020-05-20 DIAGNOSIS — N20.0 KIDNEY STONE: ICD-10-CM

## 2020-05-20 DIAGNOSIS — R31.9 HEMATURIA, UNSPECIFIED TYPE: Primary | ICD-10-CM

## 2020-05-20 LAB
SL AMB  POCT GLUCOSE, UA: ABNORMAL
SL AMB LEUKOCYTE ESTERASE,UA: ABNORMAL
SL AMB POCT BILIRUBIN,UA: ABNORMAL
SL AMB POCT BLOOD,UA: ABNORMAL
SL AMB POCT CLARITY,UA: ABNORMAL
SL AMB POCT COLOR,UA: YELLOW
SL AMB POCT KETONES,UA: 0.5
SL AMB POCT NITRITE,UA: 70
SL AMB POCT PH,UA: 5.5
SL AMB POCT SPECIFIC GRAVITY,UA: 1.02
SL AMB POCT URINE PROTEIN: 0.15
SL AMB POCT UROBILINOGEN: 3.5

## 2020-05-20 PROCEDURE — 99213 OFFICE O/P EST LOW 20 MIN: CPT | Performed by: NURSE PRACTITIONER

## 2020-05-20 PROCEDURE — 81002 URINALYSIS NONAUTO W/O SCOPE: CPT | Performed by: NURSE PRACTITIONER

## 2020-05-20 PROCEDURE — 1036F TOBACCO NON-USER: CPT | Performed by: NURSE PRACTITIONER

## 2020-05-20 RX ORDER — CIPROFLOXACIN 500 MG/1
500 TABLET, FILM COATED ORAL EVERY 12 HOURS SCHEDULED
Qty: 14 TABLET | Refills: 0 | Status: SHIPPED | OUTPATIENT
Start: 2020-05-20 | End: 2020-05-27

## 2020-05-25 PROBLEM — N30.01 ACUTE CYSTITIS WITH HEMATURIA: Status: ACTIVE | Noted: 2020-05-25

## 2020-05-25 PROBLEM — R31.9 HEMATURIA: Status: ACTIVE | Noted: 2020-05-25

## 2020-06-09 ENCOUNTER — TELEMEDICINE (OUTPATIENT)
Dept: INTERNAL MEDICINE CLINIC | Facility: CLINIC | Age: 60
End: 2020-06-09
Payer: MEDICARE

## 2020-06-09 DIAGNOSIS — Z20.828 EXPOSURE TO SARS-ASSOCIATED CORONAVIRUS: Primary | ICD-10-CM

## 2020-06-09 PROCEDURE — 99213 OFFICE O/P EST LOW 20 MIN: CPT | Performed by: NURSE PRACTITIONER

## 2020-06-09 RX ORDER — TAMSULOSIN HYDROCHLORIDE 0.4 MG/1
0.4 CAPSULE ORAL
COMMUNITY
Start: 2020-05-28 | End: 2020-11-12 | Stop reason: ALTCHOICE

## 2020-06-09 RX ORDER — PREDNISONE 1 MG/1
5 TABLET ORAL
COMMUNITY
Start: 2020-06-01 | End: 2021-01-22 | Stop reason: ALTCHOICE

## 2020-06-09 RX ORDER — PREDNISONE 1 MG/1
6 TABLET ORAL DAILY
COMMUNITY
Start: 2020-06-03 | End: 2020-11-12 | Stop reason: ALTCHOICE

## 2020-06-17 ENCOUNTER — DOCUMENTATION (OUTPATIENT)
Dept: INTERNAL MEDICINE CLINIC | Facility: CLINIC | Age: 60
End: 2020-06-17

## 2020-06-17 LAB — EXTERNAL SARS COV-2 ANTIBODY IGG: NEGATIVE

## 2020-06-18 DIAGNOSIS — Z20.828 EXPOSURE TO SARS-ASSOCIATED CORONAVIRUS: ICD-10-CM

## 2020-06-18 PROCEDURE — U0003 INFECTIOUS AGENT DETECTION BY NUCLEIC ACID (DNA OR RNA); SEVERE ACUTE RESPIRATORY SYNDROME CORONAVIRUS 2 (SARS-COV-2) (CORONAVIRUS DISEASE [COVID-19]), AMPLIFIED PROBE TECHNIQUE, MAKING USE OF HIGH THROUGHPUT TECHNOLOGIES AS DESCRIBED BY CMS-2020-01-R: HCPCS | Performed by: OBSTETRICS & GYNECOLOGY

## 2020-06-19 LAB — SARS-COV-2 RNA SPEC QL NAA+PROBE: NOT DETECTED

## 2020-06-20 DIAGNOSIS — G47.61 PLMD (PERIODIC LIMB MOVEMENT DISORDER): ICD-10-CM

## 2020-06-21 RX ORDER — CLONAZEPAM 1 MG/1
2 TABLET ORAL
Qty: 60 TABLET | Refills: 2 | Status: SHIPPED | OUTPATIENT
Start: 2020-06-21 | End: 2020-09-18

## 2020-06-22 DIAGNOSIS — E53.8 B12 DEFICIENCY: ICD-10-CM

## 2020-06-23 RX ORDER — SYRINGE WITH NEEDLE, 1 ML 25GX5/8"
SYRINGE, EMPTY DISPOSABLE MISCELLANEOUS
Qty: 12 EACH | Refills: 0 | Status: SHIPPED | OUTPATIENT
Start: 2020-06-23 | End: 2020-12-17

## 2020-07-17 ENCOUNTER — TELEMEDICINE (OUTPATIENT)
Dept: INTERNAL MEDICINE CLINIC | Facility: CLINIC | Age: 60
End: 2020-07-17
Payer: MEDICARE

## 2020-07-17 VITALS
HEART RATE: 71 BPM | HEIGHT: 65 IN | BODY MASS INDEX: 23.16 KG/M2 | SYSTOLIC BLOOD PRESSURE: 118 MMHG | WEIGHT: 139 LBS | DIASTOLIC BLOOD PRESSURE: 70 MMHG

## 2020-07-17 DIAGNOSIS — Z20.828 EXPOSURE TO SARS-ASSOCIATED CORONAVIRUS: Primary | ICD-10-CM

## 2020-07-17 PROCEDURE — 99214 OFFICE O/P EST MOD 30 MIN: CPT | Performed by: NURSE PRACTITIONER

## 2020-07-17 RX ORDER — ESTRADIOL 10 UG/1
INSERT VAGINAL
COMMUNITY
Start: 2020-07-17

## 2020-07-17 RX ORDER — PREDNISONE 1 MG/1
6 TABLET ORAL DAILY
COMMUNITY
Start: 2019-02-22 | End: 2021-01-22 | Stop reason: ALTCHOICE

## 2020-07-17 NOTE — PROGRESS NOTES
COVID-19 Virtual Visit     Assessment/Plan:    Problem List Items Addressed This Visit        Other    Exposure to SARS-associated coronavirus - Primary     She needs testing before seeing her elderly father  Test entered         Relevant Orders    MISC COVID-19 TEST- Collected at [facility] or [facility]        This virtual check-in was done via [employer] and patient was informed that this is not a secure, HIPAA-complaint platform  She agrees to proceed     Disposition:      I referred Brianne Marcelino to one of our centralized sites for a COVID-19 swab    I spent 15 minutes directly with the patient during this visit    Encounter provider STEPHON Contreras    Provider located at [address] [place] [id number]    Recent Visits  Date Type Provider Dept   07/17/20 Telemedicine Jacquie Contreras recent visits within past 7 days and meeting all other requirements     Future Appointments  No visits were found meeting these conditions  Showing future appointments within next 150 days and meeting all other requirements        Patient agrees to participate in a virtual check in via telephone or video visit instead of presenting to the office to address urgent/immediate medical needs  Patient is aware this is a billable service  After connecting through [employer], the patient was identified by name and date of birth  Darrel Ag was informed that this was a telemedicine visit and that the exam was being conducted confidentially over secure lines  My office door was closed  No one else was in the room  Darrel Ag acknowledged consent and understanding of privacy and security of the telemedicine visit  I informed the patient that I have reviewed her record in Epic and presented the opportunity for her to ask any questions regarding the visit today  The patient agreed to participate  Subjective  Darrel Ag is a 60 y o  female who is concerned about COVID-19  She reports no symptoms, requires screening  She has not experienced no symptoms, requires screening She has not had contact with a person who is under investigation for or who is positive for COVID-19 within the last 14 days  She has not been hospitalized recently for fever and/or lower respiratory symptoms  Past Medical History:   Diagnosis Date    At[facility] fibrillation ([facility] ) 10/20/2017    Overview:  10/2017- 1 5 hour run of Afib while in ED w/ abdominal pain  No prior hx  Last Assessment & Plan:  SR today, CHADSVASC=0- taking ASA 81mg  Normal exam today   Concussion with brief LOC     WITH LOC, 30 MIN OR LESS, SEQUELA; LAST ASSESSED: AND RESOLVED 8/21/17    Fatigue     LAST ASSESSED: AND RESOLVED: 6/26/16       No past surgical history on file  Current Outpatient Medications   Medication Sig Dispense Refill    acetaminophen (TYLENOL) 500 mg tablet Take 500 mg by mouth every 6 (six) hours      amphetamine-dextroamphetamine (ADDERALL) 5 MG tablet Take 5 mg by mouth 2 (two) times a day      aspirin 81 MG tablet Take 81 mg by mouth      azaTHIOprine (IMURAN) 50 mg tablet Take 100 mg by mouth daily       B-D 3CC LUER-RITESH SYR 23GX1" 23G X 1" 3 ML MISC USE AS DIRECTED  12 each 0    Cholecalciferol 2000 units CAPS Take 5,000 Units by mouth daily      clonazePAM (KlonoPIN) 1 mg tablet TAKE 2 TABLETS (2 MG TOTAL) BY MOUTH DAILY AT BEDTIME 60 tablet 2    colestipol (COLESTID) 1 g tablet Take 4 tablets (4 g total) by mouth daily      cyanocobalamin 1,000 mcg/mL Inject 1 mL (1,000 mcg total) into a muscle every 14 (fourteen) days 12 mL 1    DULoxetine (CYMBALTA) 20 mg capsule TAKE 1 CAPSULE BY MOUTH WITH 30 MG 90 capsule 0    DULoxetine (CYMBALTA) 30 mg delayed release capsule TAKE 1 CAPSULE BY MOUTH DAILY   ALTERNATE WITH THE 60MG EVERY OTHER DAY  2    estradiol (VAGIFEM, YUVAFEM) 10 MCG TABS vaginal tablet Insert 1 tab intravaginally daily x 2 weeks; then twice weekly   Ferrous Fumarate 325 (106 Fe) MG TABS Take 5 mL by mouth every other day Patient takes 125 mg daily (Patient taking differently: Take 5 mL by mouth once a week Patient takes 125 mg daily)      folic acid (FOLVITE) 1 mg tablet Take 2 mg by mouth daily      Magnesium Gluconate 27 5 MG TABS Take 400 mg by mouth daily      medroxyPROGESTERone (PROVERA) 2 5 mg tablet medroxyprogesterone 2 5 mg tablet      MISCELLANEOUS VAGINAL PRODUCTS VA Compound Ingredients: estriol 1 mg/ 1g (0 1%); apply to thigh once daily; pump bottle if available, enough for 30 day supply         pravastatin (PRAVACHOL) 10 mg tablet Take 1 tablet (10 mg total) by mouth daily at bedtime 30 tablet 5    predniSONE 1 mg tablet Take 6 mg by mouth daily       predniSONE 5 mg tablet Take 5 mg by mouth daily with breakfast      RITUXIMAB IV Infuse into a venous catheter      cyclobenzaprine (FLEXERIL) 10 mg tablet cyclobenzaprine 10 mg tablet      predniSONE 1 mg tablet 6 mg daily      predniSONE 10 mg tablet Take 9 mg by mouth daily      tamsulosin (FLOMAX) 0 4 mg Take 0 4 mg by mouth       No current facility-administered medications for this visit  Allergies   Allergen Reactions    Infliximab Other (See Comments)     Drug induced lupus    Cimzia [Certolizumab Pegol] Other (See Comments)     Joint pain, headache, exhaustion    Abatacept Rash    Sulfasalazine Rash       Review of Systems   Constitutional: Negative  HENT: Negative  Eyes: Negative  Respiratory: Negative  Cardiovascular: Negative  Gastrointestinal: Negative  Musculoskeletal: Negative  Neurological: Negative  Video Exam    Vitals:    07/17/20 1137   BP: 118/70   Pulse: 71   Weight: 63 kg (139 lb)   Height: 5' 5 2" (1 656 m)         Abby Wilcox appears healthy, alert  Physical Exam   Constitutional: She appears well-developed and well-nourished  Vitals reviewed         VIRTUAL VISIT [facility] acknowledges that she has consented to an online visit or consultation  She understands that the online visit is based solely on information provided by her, and that, in the absence of a face-to-face physical evaluation by the physician, the diagnosis she receives is both limited and provisional in terms of accuracy and completeness  This is not intended to replace a full medical face-to-face evaluation by the physician  Lucho Law understands and accepts these terms

## 2020-07-19 PROBLEM — Z20.828 EXPOSURE TO SARS-ASSOCIATED CORONAVIRUS: Status: ACTIVE | Noted: 2020-07-19

## 2020-07-27 DIAGNOSIS — Z20.828 EXPOSURE TO SARS-ASSOCIATED CORONAVIRUS: ICD-10-CM

## 2020-07-27 PROCEDURE — U0003 INFECTIOUS AGENT DETECTION BY NUCLEIC ACID (DNA OR RNA); SEVERE ACUTE RESPIRATORY SYNDROME CORONAVIRUS 2 (SARS-COV-2) (CORONAVIRUS DISEASE [COVID-19]), AMPLIFIED PROBE TECHNIQUE, MAKING USE OF HIGH THROUGHPUT TECHNOLOGIES AS DESCRIBED BY CMS-2020-01-R: HCPCS

## 2020-07-28 LAB — SARS-COV-2 RNA SPEC QL NAA+PROBE: NOT DETECTED

## 2020-08-27 ENCOUNTER — TELEMEDICINE (OUTPATIENT)
Dept: INTERNAL MEDICINE CLINIC | Facility: CLINIC | Age: 60
End: 2020-08-27
Payer: MEDICARE

## 2020-08-27 DIAGNOSIS — J01.90 ACUTE NON-RECURRENT SINUSITIS, UNSPECIFIED LOCATION: Primary | ICD-10-CM

## 2020-08-27 PROCEDURE — 1036F TOBACCO NON-USER: CPT | Performed by: INTERNAL MEDICINE

## 2020-08-27 PROCEDURE — 99213 OFFICE O/P EST LOW 20 MIN: CPT | Performed by: INTERNAL MEDICINE

## 2020-08-27 RX ORDER — AMOXICILLIN AND CLAVULANATE POTASSIUM 875; 125 MG/1; MG/1
1 TABLET, FILM COATED ORAL EVERY 12 HOURS SCHEDULED
Qty: 20 TABLET | Refills: 0 | Status: SHIPPED | OUTPATIENT
Start: 2020-08-27 | End: 2020-09-06

## 2020-08-27 NOTE — PROGRESS NOTES
COVID-19 Virtual Visit     Assessment/Plan:    Problem List Items Addressed This Visit     None        This virtual check-in was done via {AMB CORONAVIRUS VISIT PGIRKM:17214}  Disposition:      {AMB COVID-19 DISPOSITION:52553}    {covid time spent:08572}    Encounter provider Jeimy Abdi MD    Provider located at [address] [place] [phone]    Recent Visits  No visits were found meeting these conditions  Showing recent visits within past 7 days and meeting all other requirements     Today's Visits  Date Type Provider Dept   08/27/20 Telemedicine Jeimy Abdi MD [address] today's visits and meeting all other requirements     Future Appointments  No visits were found meeting these conditions  Showing future appointments within next 150 days and meeting all other requirements        Patient agrees to participate in a virtual check in via telephone or video visit instead of presenting to the office to address urgent/immediate medical needs  Patient is aware this is a billable service  After connecting through {Communication Method:39383}, the patient was identified by name and date of birth  Akilah Haas was informed that this was a telemedicine visit and that the exam was being conducted confidentially over secure lines  {Telemedicine confidentiality :56212} {Telemedicine participants:50077}  Akilah Haas acknowledged consent and understanding of privacy and security of the telemedicine visit  I informed the patient that I have reviewed her record in Epic and presented the opportunity for her to ask any questions regarding the visit today  The patient agreed to participate  Subjective  Akilah Haas is a 60 y o  female who is concerned about COVID-19  She reports {COVID-19 SYMPTOMS:54004:::0}   She has not experienced {COVID-19 SYMPTOMS:00420:::0} She {HAS/HAS NOT:20194} had contact with a person who is under investigation for or who is positive for COVID-19 within the last 14 days  She {HAS/HAS NOT:20194} been hospitalized recently for fever and/or lower respiratory symptoms  Past Medical History:   Diagnosis Date    Atrial fibrillation ([address] ) 10/20/2017    Overview:  10/2017- 1 5 hour run of Afib while in ED w/ abdominal pain  No prior hx  Last Assessment & Plan:  SR today, CHADSVASC=0- taking ASA 81mg  Normal exam today   Concussion with brief LOC     WITH LOC, 30 MIN OR LESS, SEQUELA; LAST ASSESSED: AND RESOLVED 8/21/17    Fatigue     LAST ASSESSED: AND RESOLVED: 6/26/16       No past surgical history on file  Current Outpatient Medications   Medication Sig Dispense Refill    acetaminophen (TYLENOL) 500 mg tablet Take 500 mg by mouth every 6 (six) hours      amphetamine-dextroamphetamine (ADDERALL) 5 MG tablet Take 5 mg by mouth 2 (two) times a day      aspirin 81 MG tablet Take 81 mg by mouth      azaTHIOprine (IMURAN) 50 mg tablet Take 100 mg by mouth daily       B-D 3CC LUER-RITESH SYR 23GX1" 23G X 1" 3 ML MISC USE AS DIRECTED  12 each 0    Cholecalciferol 2000 units CAPS Take 5,000 Units by mouth daily      clonazePAM (KlonoPIN) 1 mg tablet TAKE 2 TABLETS (2 MG TOTAL) BY MOUTH DAILY AT BEDTIME 60 tablet 2    colestipol (COLESTID) 1 g tablet Take 4 tablets (4 g total) by mouth daily      cyanocobalamin 1,000 mcg/mL Inject 1 mL (1,000 mcg total) into a muscle every 14 (fourteen) days 12 mL 1    cyclobenzaprine (FLEXERIL) 10 mg tablet cyclobenzaprine 10 mg tablet      DULoxetine (CYMBALTA) 20 mg capsule TAKE 1 CAPSULE BY MOUTH WITH 30 MG 90 capsule 0    DULoxetine (CYMBALTA) 30 mg delayed release capsule TAKE 1 CAPSULE BY MOUTH DAILY  ALTERNATE WITH THE 60MG EVERY OTHER DAY  2    estradiol (VAGIFEM, YUVAFEM) 10 MCG TABS vaginal tablet Insert 1 tab intravaginally daily x 2 weeks; then twice weekly        Ferrous Fumarate 325 (106 Fe) MG TABS Take 5 mL by mouth every other day Patient takes 125 mg daily (Patient taking differently: Take 5 mL by mouth once a week Patient takes 125 mg daily)      folic acid (FOLVITE) 1 mg tablet Take 2 mg by mouth daily      hydroxychloroquine (PLAQUENIL) 200 mg tablet Take 200 mg by mouth 2 (two) times a day      Magnesium Gluconate 27 5 MG TABS Take 400 mg by mouth daily      medroxyPROGESTERone (PROVERA) 2 5 mg tablet medroxyprogesterone 2 5 mg tablet      MISCELLANEOUS VAGINAL PRODUCTS VA Compound Ingredients: estriol 1 mg/ 1g (0 1%); apply to thigh once daily; pump bottle if available, enough for 30 day supply         oxybutynin (DITROPAN-XL) 5 mg 24 hr tablet TAKE 1 TABLET (5 MG TOTAL) BY MOUTH DAILY  AS NEEDED FOR BLADDER SPASMS, URGENCY, AND FREQUENCY   pravastatin (PRAVACHOL) 10 mg tablet Take 1 tablet (10 mg total) by mouth daily at bedtime 30 tablet 5    predniSONE 1 mg tablet Take 6 mg by mouth daily       predniSONE 1 mg tablet 6 mg daily      predniSONE 10 mg tablet Take 9 mg by mouth daily      predniSONE 5 mg tablet Take 5 mg by mouth daily with breakfast      RITUXIMAB IV Infuse into a venous catheter      tamsulosin (FLOMAX) 0 4 mg Take 0 4 mg by mouth       No current facility-administered medications for this visit  Allergies   Allergen Reactions    Infliximab Other (See Comments)     Drug induced lupus    Cimzia [Certolizumab Pegol] Other (See Comments)     Joint pain, headache, exhaustion    Abatacept Rash    Sulfasalazine Rash       Review of Systems    Video Exam    There were no vitals filed for this visit  Pena Knife appears {GENERAL APPEARANCE:68998}  Physical Exam     VIRTUAL VISIT DISCLAIMER    Amelia Sanford acknowledges that she has consented to an online visit or consultation   She understands that the online visit is based solely on information provided by her, and that, in the absence of a face-to-face physical evaluation by the physician, the diagnosis she receives is both limited and provisional in terms of accuracy and completeness  This is not intended to replace a full medical face-to-face evaluation by the physician  Cynda Felty understands and accepts these terms

## 2020-08-27 NOTE — PROGRESS NOTES
Virtual Regular Visit      Assessment/Plan:  Start Augmentin  Start Mucinex DM  Start Ventolin PRN which she already has at home  Call if worsening/not improving  Problem List Items Addressed This Visit     None      Visit Diagnoses     Acute non-recurrent sinusitis, unspecified location    -  Primary    Relevant Medications    amoxicillin-clavulanate (Augmentin) 875-125 mg per tablet               Reason for visit is   Chief Complaint   Patient presents with    COVID-19    Virtual Regular Visit        Encounter provider Asher Barrera MD    Provider located at [address] [place] [phone]      Recent Visits  No visits were found meeting these conditions  Showing recent visits within past 7 days and meeting all other requirements     Today's Visits  Date Type Provider Dept   08/27/20 Telemedicine Asher Barrera MD [address] [facility] today's visits and meeting all other requirements     Future Appointments  No visits were found meeting these conditions  Showing future appointments within next 150 days and meeting all other requirements        The patient was identified by name and date of birth  Jacqueline Pena was informed that this is a telemedicine visit and that the visit is being conducted through [facility] [place] and patient was informed that this is not a secure, HIPAA-complaint platform  She agrees to proceed     My office door was closed  No one else was in the room  She acknowledged consent and understanding of privacy and security of the video platform  The patient has agreed to participate and understands they can discontinue the visit at any time  Patient is aware this is a billable service  Subjective  Jacqueline Pena is a 60 y o  female with sinus drainage, cough, sore throat         A few weeks of a sore throat  +Green sinus drainage  +Productive cough with chest congestion, sometimes wheezing  No fever, chills  No OTC meds taken  She is not worried about COVID  She is immunosuppressed on prednisone 6mg, Imuran, Rituxan, and Plaquenil for Crohns disease MS and RA  No contact with VIRGEN Parra for COVID         Past Medical History:   Diagnosis Date    Atrial fibrillation ([address] ) 10/20/2017    Overview:  10/2017- 1 5 hour run of Afib while in ED w/ abdominal pain  No prior hx  Last Assessment & Plan:  SR today, CHADSVASC=0- taking ASA 81mg  Normal exam today   Concussion with brief LOC     WITH LOC, 30 MIN OR LESS, SEQUELA; LAST ASSESSED: AND RESOLVED 8/21/17    Fatigue     LAST ASSESSED: AND RESOLVED: 6/26/16       No past surgical history on file  Current Outpatient Medications   Medication Sig Dispense Refill    acetaminophen (TYLENOL) 500 mg tablet Take 500 mg by mouth every 6 (six) hours      amoxicillin-clavulanate (Augmentin) 875-125 mg per tablet Take 1 tablet by mouth every 12 (twelve) hours for 10 days 20 tablet 0    amphetamine-dextroamphetamine (ADDERALL) 5 MG tablet Take 5 mg by mouth 2 (two) times a day      aspirin 81 MG tablet Take 81 mg by mouth      azaTHIOprine (IMURAN) 50 mg tablet Take 100 mg by mouth daily       B-D 3CC LUER-RITESH SYR 23GX1" 23G X 1" 3 ML MISC USE AS DIRECTED  12 each 0    Cholecalciferol 2000 units CAPS Take 5,000 Units by mouth daily      clonazePAM (KlonoPIN) 1 mg tablet TAKE 2 TABLETS (2 MG TOTAL) BY MOUTH DAILY AT BEDTIME 60 tablet 2    colestipol (COLESTID) 1 g tablet Take 4 tablets (4 g total) by mouth daily      cyanocobalamin 1,000 mcg/mL Inject 1 mL (1,000 mcg total) into a muscle every 14 (fourteen) days 12 mL 1    cyclobenzaprine (FLEXERIL) 10 mg tablet cyclobenzaprine 10 mg tablet      DULoxetine (CYMBALTA) 20 mg capsule TAKE 1 CAPSULE BY MOUTH WITH 30 MG 90 capsule 0    DULoxetine (CYMBALTA) 30 mg delayed release capsule TAKE 1 CAPSULE BY MOUTH DAILY   ALTERNATE WITH THE 60MG EVERY OTHER DAY  2    estradiol (VAGIFEM, YUVAFEM) 10 MCG TABS vaginal tablet Insert 1 tab intravaginally daily x 2 weeks; then twice weekly   Ferrous Fumarate 325 (106 Fe) MG TABS Take 5 mL by mouth every other day Patient takes 125 mg daily (Patient taking differently: Take 5 mL by mouth once a week Patient takes 125 mg daily)      folic acid (FOLVITE) 1 mg tablet Take 2 mg by mouth daily      hydroxychloroquine (PLAQUENIL) 200 mg tablet Take 200 mg by mouth 2 (two) times a day      Magnesium Gluconate 27 5 MG TABS Take 400 mg by mouth daily      medroxyPROGESTERone (PROVERA) 2 5 mg tablet medroxyprogesterone 2 5 mg tablet      MISCELLANEOUS VAGINAL PRODUCTS VA Compound Ingredients: estriol 1 mg/ 1g (0 1%); apply to thigh once daily; pump bottle if available, enough for 30 day supply         oxybutynin (DITROPAN-XL) 5 mg 24 hr tablet TAKE 1 TABLET (5 MG TOTAL) BY MOUTH DAILY  AS NEEDED FOR BLADDER SPASMS, URGENCY, AND FREQUENCY   pravastatin (PRAVACHOL) 10 mg tablet Take 1 tablet (10 mg total) by mouth daily at bedtime 30 tablet 5    predniSONE 1 mg tablet Take 6 mg by mouth daily       predniSONE 1 mg tablet 6 mg daily      predniSONE 10 mg tablet Take 9 mg by mouth daily      predniSONE 5 mg tablet Take 5 mg by mouth daily with breakfast      RITUXIMAB IV Infuse into a venous catheter      tamsulosin (FLOMAX) 0 4 mg Take 0 4 mg by mouth       No current facility-administered medications for this visit  Allergies   Allergen Reactions    Infliximab Other (See Comments)     Drug induced lupus    Cimzia [Certolizumab Pegol] Other (See Comments)     Joint pain, headache, exhaustion    Abatacept Rash    Sulfasalazine Rash       Review of Systems   Constitutional: Negative for chills and fever  HENT: Positive for rhinorrhea and sore throat  Negative for congestion  Respiratory: Positive for cough, chest tightness and wheezing  Gastrointestinal: Positive for diarrhea (chronic)  Video Exam    There were no vitals filed for this visit      Physical Exam  Constitutional:       General: She is not in acute distress  Appearance: She is not ill-appearing, toxic-appearing or diaphoretic  Pulmonary:      Effort: No respiratory distress  Neurological:      Mental Status: She is alert  Psychiatric:         Mood and Affect: Mood normal          Behavior: Behavior normal          Thought Content: Thought content normal          Judgment: Judgment normal           I spent 15 minutes directly with the patient during this visit      VIRTUAL VISIT DISCLAIMER    Roscoenadine Reyes acknowledges that she has consented to an online visit or consultation  She understands that the online visit is based solely on information provided by her, and that, in the absence of a face-to-face physical evaluation by the physician, the diagnosis she receives is both limited and provisional in terms of accuracy and completeness  This is not intended to replace a full medical face-to-face evaluation by the physician  Leydi Reyes understands and accepts these terms

## 2020-09-16 DIAGNOSIS — E53.8 B12 DEFICIENCY: ICD-10-CM

## 2020-09-16 RX ORDER — CYANOCOBALAMIN 1000 UG/ML
INJECTION INTRAMUSCULAR; SUBCUTANEOUS
Qty: 6 ML | Refills: 3 | Status: SHIPPED | OUTPATIENT
Start: 2020-09-16 | End: 2020-09-29 | Stop reason: SDUPTHER

## 2020-09-17 DIAGNOSIS — G47.61 PLMD (PERIODIC LIMB MOVEMENT DISORDER): ICD-10-CM

## 2020-09-17 RX ORDER — POTASSIUM CITRATE 10 MEQ/1
10 TABLET, EXTENDED RELEASE ORAL
COMMUNITY
Start: 2020-09-02 | End: 2021-04-09 | Stop reason: ALTCHOICE

## 2020-09-18 RX ORDER — CLONAZEPAM 1 MG/1
2 TABLET ORAL
Qty: 60 TABLET | Refills: 2 | Status: SHIPPED | OUTPATIENT
Start: 2020-09-18 | End: 2020-12-16

## 2020-09-22 ENCOUNTER — OFFICE VISIT (OUTPATIENT)
Dept: INTERNAL MEDICINE CLINIC | Facility: CLINIC | Age: 60
End: 2020-09-22
Payer: MEDICARE

## 2020-09-22 VITALS
OXYGEN SATURATION: 97 % | HEART RATE: 67 BPM | WEIGHT: 135 LBS | SYSTOLIC BLOOD PRESSURE: 110 MMHG | DIASTOLIC BLOOD PRESSURE: 70 MMHG | HEIGHT: 65 IN | BODY MASS INDEX: 22.49 KG/M2 | TEMPERATURE: 98.1 F

## 2020-09-22 DIAGNOSIS — R79.0 LOW IRON STORES: ICD-10-CM

## 2020-09-22 DIAGNOSIS — E78.5 DYSLIPIDEMIA: ICD-10-CM

## 2020-09-22 DIAGNOSIS — N20.0 URIC ACID RENAL CALCULUS: ICD-10-CM

## 2020-09-22 DIAGNOSIS — M05.79 RHEUMATOID ARTHRITIS INVOLVING MULTIPLE SITES WITH POSITIVE RHEUMATOID FACTOR (HCC): ICD-10-CM

## 2020-09-22 DIAGNOSIS — G35 MULTIPLE SCLEROSIS (HCC): ICD-10-CM

## 2020-09-22 DIAGNOSIS — K50.90 CROHN'S DISEASE WITHOUT COMPLICATION, UNSPECIFIED GASTROINTESTINAL TRACT LOCATION (HCC): ICD-10-CM

## 2020-09-22 DIAGNOSIS — R53.82 CHRONIC FATIGUE SYNDROME: ICD-10-CM

## 2020-09-22 DIAGNOSIS — E78.1 ESSENTIAL HYPERTRIGLYCERIDEMIA: Primary | ICD-10-CM

## 2020-09-22 DIAGNOSIS — E55.9 VITAMIN D DEFICIENCY: ICD-10-CM

## 2020-09-22 PROCEDURE — 99214 OFFICE O/P EST MOD 30 MIN: CPT | Performed by: INTERNAL MEDICINE

## 2020-09-22 NOTE — PROGRESS NOTES
Assessment/Plan:    Crohn disease ([facility] )  Appears stable  F/u with GI    Chronic fatigue syndrome  Better on Adderall    Multiple sclerosis (HCC)  Possibly related to Cimzia  F/U with neurology at [facility] and will see neurology at [facility]  On Rituxan, Imuran, prednisone    Rheumatoid arthritis ([facility] )  On Rituxan, Imuran, prednisone and Plaquenil    Uric acid renal calculus  On potassium citrate  24urine collection to be done    Dyslipidemia  Continue pravastatin         Problem List Items Addressed This Visit        Digestive    Crohn disease ([facility] )     Appears stable  F/u with GI            Nervous and Auditory    Chronic fatigue syndrome     Better on Adderall         Multiple sclerosis ([facility] )     Possibly related to Cimzia  F/U with neurology at [facility] and will see neurology at [facility]  On Rituxan, Imuran, prednisone            Musculoskeletal and Integument    Rheumatoid arthritis ([facility] )     On Rituxan, Imuran, prednisone and Plaquenil            Genitourinary    Uric acid renal calculus     On potassium citrate  24urine collection to be done         Relevant Medications    potassium citrate (UROCIT-K) 10 mEq       Other    Dyslipidemia - Primary     Continue pravastatin           Other Visit Diagnoses     Vitamin D deficiency        Relevant Orders    Vitamin D 25 hydroxy    Low iron stores        Relevant Orders    Ferritin    CBC and differential            Subjective:      Patient ID: Lilli Meraz is a 60 y o  female      HPI  Right upper quadrant pain x 3-4months, not radiating with no aggravating or alleviating factors  She is waiting to hear from GI  S/p cholecystectomy many years ago, ERCP for a retained stone  Sees urology for uric acid stones, and CT ordered for the RUQ pain  CT done last week showed tiny liver cysts and minimal biliary dilatation without filling defects, stable from previous CTs   Also with bilateral punctate stones   S/p Bilateral ureteroscopy with laser lithotripsy", ureteroscopy with basket extraction of stone in June  It was explained to her that her RUQ pain is not from her kidneys  BMP and 24 h urine ordered  She is on potassium citrate for the uric acid stones  MS , RA on Rituxan  Going to see neurology at [facility] for this  Demyelinating lesions suspected to be from Cimzia she took last year   RA stable +OA right hand   Endocrine managing prednisone  Fatigue better on Adderall prescribed by neurology  She remains on Imuran, Plaquenil    The following portions of the patient's history were reviewed and updated as appropriate: allergies, current medications, past family history, past medical history, past social history, past surgical history and problem list     Review of Systems   Constitutional: Positive for fatigue  Negative for chills, fever and unexpected weight change  HENT: Negative for congestion, sinus pressure and sore throat  Respiratory: Negative for cough and shortness of breath  Cardiovascular: Negative for chest pain, palpitations and leg swelling  Gastrointestinal: Positive for abdominal pain and diarrhea  Negative for constipation, nausea and vomiting  Genitourinary: Negative for difficulty urinating  Musculoskeletal: Positive for arthralgias  Neurological: Negative for dizziness and headaches  Objective:      /70   Pulse 67   Temp 98 1 °F (36 7 °C)   Ht 5' 5 2" (1 656 m)   Wt 61 2 kg (135 lb)   SpO2 97%   BMI 22 33 kg/m²          Physical Exam  Constitutional:       General: She is not in acute distress  Appearance: Normal appearance  She is well-developed  She is not ill-appearing, toxic-appearing or diaphoretic  HENT:      Head: Normocephalic and atraumatic  Eyes:      Conjunctiva/sclera: Conjunctivae normal    Neck:      Musculoskeletal: Neck supple  Cardiovascular:      Rate and Rhythm: Normal rate and regular rhythm  Heart sounds: Normal heart sounds  No murmur     Pulmonary:      Effort: Pulmonary effort is normal  No respiratory distress  Breath sounds: Normal breath sounds  No wheezing or rales  Abdominal:      General: Bowel sounds are normal  There is no distension  Palpations: Abdomen is soft  There is no mass  Tenderness: There is abdominal tenderness in the right upper quadrant  There is no guarding or rebound  Musculoskeletal:         General: Deformity (fingers are swollen, [place] nodules bilaterally) present  Skin:     General: Skin is warm and dry  Neurological:      Mental Status: She is alert and oriented to person, place, and time  Psychiatric:         Mood and Affect: Mood normal          Behavior: Behavior normal          Thought Content:  Thought content normal          Judgment: Judgment normal

## 2020-09-23 PROBLEM — Z20.828 EXPOSURE TO SARS-ASSOCIATED CORONAVIRUS: Status: RESOLVED | Noted: 2020-07-19 | Resolved: 2020-09-23

## 2020-09-23 PROBLEM — N30.01 ACUTE CYSTITIS WITH HEMATURIA: Status: RESOLVED | Noted: 2020-05-25 | Resolved: 2020-09-23

## 2020-09-23 PROBLEM — N20.0 URIC ACID RENAL CALCULUS: Status: ACTIVE | Noted: 2020-09-23

## 2020-09-23 NOTE — ASSESSMENT & PLAN NOTE
Possibly related to Cimzia  F/U with neurology at [facility] and will see neurology at [address] [place] [address], Imuran, [facility]

## 2020-09-29 DIAGNOSIS — E53.8 B12 DEFICIENCY: ICD-10-CM

## 2020-09-29 RX ORDER — CYANOCOBALAMIN 1000 UG/ML
1000 INJECTION INTRAMUSCULAR; SUBCUTANEOUS
Qty: 1 ML | Refills: 3 | Status: SHIPPED | OUTPATIENT
Start: 2020-09-29 | End: 2020-11-15

## 2020-10-05 ENCOUNTER — TELEMEDICINE (OUTPATIENT)
Dept: INTERNAL MEDICINE CLINIC | Facility: CLINIC | Age: 60
End: 2020-10-05
Payer: MEDICARE

## 2020-10-05 DIAGNOSIS — J06.9 UPPER RESPIRATORY TRACT INFECTION, UNSPECIFIED TYPE: Primary | ICD-10-CM

## 2020-10-05 PROCEDURE — 99214 OFFICE O/P EST MOD 30 MIN: CPT | Performed by: NURSE PRACTITIONER

## 2020-10-05 RX ORDER — AZITHROMYCIN 250 MG/1
TABLET, FILM COATED ORAL
Qty: 6 TABLET | Refills: 0 | Status: SHIPPED | OUTPATIENT
Start: 2020-10-05 | End: 2020-10-09

## 2020-10-15 ENCOUNTER — IMMUNIZATIONS (OUTPATIENT)
Dept: INTERNAL MEDICINE CLINIC | Facility: CLINIC | Age: 60
End: 2020-10-15
Payer: MEDICARE

## 2020-10-15 DIAGNOSIS — Z23 ENCOUNTER FOR IMMUNIZATION: ICD-10-CM

## 2020-10-15 PROCEDURE — G0008 ADMIN INFLUENZA VIRUS VAC: HCPCS

## 2020-10-15 PROCEDURE — 90682 RIV4 VACC RECOMBINANT DNA IM: CPT

## 2020-10-16 DIAGNOSIS — E78.1 ESSENTIAL HYPERTRIGLYCERIDEMIA: ICD-10-CM

## 2020-10-16 RX ORDER — PRAVASTATIN SODIUM 10 MG
TABLET ORAL
Qty: 90 TABLET | Refills: 3 | Status: SHIPPED | OUTPATIENT
Start: 2020-10-16 | End: 2021-10-23

## 2020-11-12 ENCOUNTER — TELEMEDICINE (OUTPATIENT)
Dept: INTERNAL MEDICINE CLINIC | Facility: CLINIC | Age: 60
End: 2020-11-12
Payer: MEDICARE

## 2020-11-12 VITALS — WEIGHT: 135 LBS | HEIGHT: 65 IN | BODY MASS INDEX: 22.49 KG/M2 | TEMPERATURE: 97.3 F

## 2020-11-12 DIAGNOSIS — R14.0 ABDOMINAL BLOATING: Primary | ICD-10-CM

## 2020-11-12 PROCEDURE — 99213 OFFICE O/P EST LOW 20 MIN: CPT | Performed by: NURSE PRACTITIONER

## 2020-11-12 RX ORDER — HYDROCORTISONE 5 MG/1
20 TABLET ORAL DAILY
COMMUNITY
Start: 2020-10-21 | End: 2022-04-22

## 2020-11-12 RX ORDER — METRONIDAZOLE 500 MG/1
500 TABLET ORAL EVERY 12 HOURS SCHEDULED
Qty: 28 TABLET | Refills: 0 | Status: SHIPPED | OUTPATIENT
Start: 2020-11-12 | End: 2020-11-26

## 2020-11-15 DIAGNOSIS — E53.8 B12 DEFICIENCY: ICD-10-CM

## 2020-11-15 RX ORDER — CYANOCOBALAMIN 1000 UG/ML
INJECTION INTRAMUSCULAR; SUBCUTANEOUS
Qty: 6 ML | Refills: 3 | Status: SHIPPED | OUTPATIENT
Start: 2020-11-15 | End: 2020-12-17

## 2020-11-17 PROBLEM — R14.0 ABDOMINAL BLOATING: Status: ACTIVE | Noted: 2020-11-17

## 2020-11-20 ENCOUNTER — TELEMEDICINE (OUTPATIENT)
Dept: INTERNAL MEDICINE CLINIC | Facility: CLINIC | Age: 60
End: 2020-11-20
Payer: MEDICARE

## 2020-11-20 DIAGNOSIS — Z20.822 EXPOSURE TO COVID-19 VIRUS: Primary | ICD-10-CM

## 2020-11-20 DIAGNOSIS — Z20.822 EXPOSURE TO COVID-19 VIRUS: ICD-10-CM

## 2020-11-20 PROCEDURE — 99442 PR PHYS/QHP TELEPHONE EVALUATION 11-20 MIN: CPT | Performed by: NURSE PRACTITIONER

## 2020-11-20 PROCEDURE — U0003 INFECTIOUS AGENT DETECTION BY NUCLEIC ACID (DNA OR RNA); SEVERE ACUTE RESPIRATORY SYNDROME CORONAVIRUS 2 (SARS-COV-2) (CORONAVIRUS DISEASE [COVID-19]), AMPLIFIED PROBE TECHNIQUE, MAKING USE OF HIGH THROUGHPUT TECHNOLOGIES AS DESCRIBED BY CMS-2020-01-R: HCPCS | Performed by: NURSE PRACTITIONER

## 2020-11-22 PROBLEM — Z20.822 EXPOSURE TO COVID-19 VIRUS: Status: ACTIVE | Noted: 2020-11-22

## 2020-11-22 LAB — SARS-COV-2 RNA SPEC QL NAA+PROBE: NOT DETECTED

## 2020-12-16 DIAGNOSIS — G47.61 PLMD (PERIODIC LIMB MOVEMENT DISORDER): ICD-10-CM

## 2020-12-16 RX ORDER — CLONAZEPAM 1 MG/1
2 TABLET ORAL
Qty: 60 TABLET | Refills: 2 | Status: SHIPPED | OUTPATIENT
Start: 2020-12-16 | End: 2021-03-16

## 2020-12-17 DIAGNOSIS — E53.8 B12 DEFICIENCY: ICD-10-CM

## 2020-12-17 RX ORDER — CYANOCOBALAMIN 1000 UG/ML
INJECTION INTRAMUSCULAR; SUBCUTANEOUS
Qty: 3 ML | Refills: 1 | Status: SHIPPED | OUTPATIENT
Start: 2020-12-17 | End: 2021-01-22 | Stop reason: SDUPTHER

## 2020-12-17 RX ORDER — SYRINGE WITH NEEDLE, 1 ML 25GX5/8"
SYRINGE, EMPTY DISPOSABLE MISCELLANEOUS
Qty: 12 EACH | Refills: 3 | Status: SHIPPED | OUTPATIENT
Start: 2020-12-17 | End: 2021-05-23 | Stop reason: SDUPTHER

## 2021-01-08 ENCOUNTER — TELEPHONE (OUTPATIENT)
Dept: INTERNAL MEDICINE CLINIC | Facility: CLINIC | Age: 61
End: 2021-01-08

## 2021-01-08 NOTE — TELEPHONE ENCOUNTER
Patient is requesting a letter listing her serious health conditons, symptoms, diagnoses and regime of continuing treatment indicating that her daughter provides care for her intermittently and would have to self quarantine at any point and time without advanced notice to care for the patient  She wants this letter so her daughter can submit it to her employer, [employer]  They are asking her daughter to return to school unvaccinated

## 2021-01-22 ENCOUNTER — TELEMEDICINE (OUTPATIENT)
Dept: INTERNAL MEDICINE CLINIC | Facility: CLINIC | Age: 61
End: 2021-01-22
Payer: MEDICARE

## 2021-01-22 DIAGNOSIS — N20.0 URIC ACID RENAL CALCULUS: ICD-10-CM

## 2021-01-22 DIAGNOSIS — M05.79 RHEUMATOID ARTHRITIS INVOLVING MULTIPLE SITES WITH POSITIVE RHEUMATOID FACTOR (HCC): ICD-10-CM

## 2021-01-22 DIAGNOSIS — Z11.9 ENCOUNTER FOR SCREENING FOR INFECTIOUS AND PARASITIC DISEASES, UNSPECIFIED: Primary | ICD-10-CM

## 2021-01-22 DIAGNOSIS — E53.8 B12 DEFICIENCY: ICD-10-CM

## 2021-01-22 DIAGNOSIS — G35 MULTIPLE SCLEROSIS (HCC): ICD-10-CM

## 2021-01-22 DIAGNOSIS — K50.90 CROHN'S DISEASE WITHOUT COMPLICATION, UNSPECIFIED GASTROINTESTINAL TRACT LOCATION (HCC): ICD-10-CM

## 2021-01-22 DIAGNOSIS — R79.0 LOW IRON STORES: ICD-10-CM

## 2021-01-22 PROBLEM — J06.9 UPPER RESPIRATORY TRACT INFECTION: Status: RESOLVED | Noted: 2020-01-09 | Resolved: 2021-01-22

## 2021-01-22 PROBLEM — Z20.822 EXPOSURE TO COVID-19 VIRUS: Status: RESOLVED | Noted: 2020-11-22 | Resolved: 2021-01-22

## 2021-01-22 PROCEDURE — 99443 PR PHYS/QHP TELEPHONE EVALUATION 21-30 MIN: CPT | Performed by: INTERNAL MEDICINE

## 2021-01-22 RX ORDER — CYANOCOBALAMIN 1000 UG/ML
1000 INJECTION INTRAMUSCULAR; SUBCUTANEOUS
Qty: 10 ML | Refills: 1 | Status: SHIPPED | OUTPATIENT
Start: 2021-01-22 | End: 2021-10-23

## 2021-01-22 RX ORDER — PSEUDOEPHEDRINE HCL 30 MG
1000 TABLET ORAL DAILY
Refills: 0
Start: 2021-01-22

## 2021-01-22 NOTE — PROGRESS NOTES
Virtual Brief Visit    Assessment/Plan:  Continue current meds and continue f/u with specialists  Obtain ferritin and B12 with next set of labs    Problem List Items Addressed This Visit        Digestive    Cro[facility] disease ([facility] )       Nervous and Auditory    Multiple sclerosis (HCC)       Musculoskeletal and Integument    Rheumatoid arthritis ([facility] )       Genitourinary    Uric acid renal calculus    Relevant Medications    Calcium Citrate 250 MG TABS      Other Visit Diagnoses     Encounter for screening for infectious and parasitic diseases, unspecified    -  Primary    Relevant Orders    Novel Coronavirus (Covid-19),PCR [facility] - Collected at [employer] or [facility]    Low iron stores        Relevant Orders    Ferritin    B12 deficiency        Relevant Medications    cyanocobalamin 1,000 mcg/mL    Other Relevant Orders    Vitamin B12                Reason for visit is   Chief Complaint   Patient presents with    Follow-up    Virtual Brief Visit        Encounter provider Zayra Potter MD    Provider located at [address] [place] [phone]    Recent Visits  No visits were found meeting these conditions  Showing recent visits within past 7 days and meeting all other requirements     Today's Visits  Date Type Provider Dept   01/22/21 Telemedicine Zayra Potter MD [address] today's visits and meeting all other requirements     Future Appointments  No visits were found meeting these conditions  Showing future appointments within next 150 days and meeting all other requirements        After connecting through telephone, the patient was identified by name and date of birth  Kalie Reyna was informed that this is a telemedicine visit and that the visit is being conducted through telephone  My office door was closed  No one else was in the room  She acknowledged consent and understanding of privacy and security of the platform   The patient has agreed to participate and understands she can discontinue the visit at any time  Patient is aware this is a billable service  Subjective    Mary Poole is a 63 y o  female with Crohns MS RA  HPI   RA Crohns MS transitioning care to [facility]  Currently on hydrocortisone 20mg /day  Recent stress test was normal    Past Medical History:   Diagnosis Date    Acute cystitis with hematuria 5/25/2020    Atrial fibrillation ([address] ) 10/20/2017    Overview:  10/2017- 1 5 hour run of Afib while in ED w/ abdominal pain  No prior hx  Last Assessment & Plan:  SR today, CHADSVASC=0- taking ASA 81mg  Normal exam today   Concussion with brief LOC     WITH LOC, 30 MIN OR LESS, SEQUELA; LAST ASSESSED: AND RESOLVED 8/21/17    Exposure to COVID-19 virus 11/22/2020    Exposure to SARS-associated coronavirus 7/19/2020    Fatigue     LAST ASSESSED: AND RESOLVED: 6/26/16    Upper respiratory tract infection 1/9/2020       No past surgical history on file  Current Outpatient Medications   Medication Sig Dispense Refill    acetaminophen (TYLENOL) 500 mg tablet Take 500 mg by mouth every 6 (six) hours      amphetamine-dextroamphetamine (ADDERALL) 5 MG tablet Take 5 mg by mouth 2 (two) times a day      aspirin 81 MG tablet Take 81 mg by mouth      azaTHIOprine (IMURAN) 50 mg tablet Take 100 mg by mouth daily       B-D 3CC LUER-RITESH SYR 23GX1" 23G X 1" 3 ML MISC USE AS DIRECTED   12 each 3    Cholecalciferol 2000 units CAPS Take 5,000 Units by mouth daily      clonazePAM (KlonoPIN) 1 mg tablet TAKE 2 TABLETS (2 MG TOTAL) BY MOUTH DAILY AT BEDTIME 60 tablet 2    colestipol (COLESTID) 1 g tablet Take 4 tablets (4 g total) by mouth daily      cyanocobalamin 1,000 mcg/mL Inject 1 mL (1,000 mcg total) under the skin every 14 (fourteen) days 10 mL 1    cyclobenzaprine (FLEXERIL) 10 mg tablet cyclobenzaprine 10 mg tablet      DULoxetine (CYMBALTA) 20 mg capsule TAKE 1 CAPSULE BY MOUTH WITH 30 MG (Patient taking differently: Take 50 mg by mouth daily TAKE 1 CAPSULE BY MOUTH WITH 30 MG) 90 capsule 0    DULoxetine (CYMBALTA) 30 mg delayed release capsule 50 mg   2    estradiol (VAGIFEM, YUVAFEM) 10 MCG TABS vaginal tablet Insert 1 tab intravaginally daily x 2 weeks; then twice weekly   Ferrous Fumarate 325 (106 Fe) MG TABS Take 5 mL by mouth every other day Patient takes 125 mg daily (Patient taking differently: Take 5 mL by mouth once a week Patient takes 125 mg daily)      folic acid (FOLVITE) 1 mg tablet Take 1 mg by mouth daily       hydrocortisone (CORTEF) 5 mg tablet Take 20 mg by mouth daily      hydroxychloroquine (PLAQUENIL) 200 mg tablet Take 200 mg by mouth 2 (two) times a day      Magnesium Gluconate 27 5 MG TABS Take 400 mg by mouth daily       medroxyPROGESTERone (PROVERA) 2 5 mg tablet medroxyprogesterone 2 5 mg tablet      potassium citrate (UROCIT-K) 10 mEq Take 10 mEq by mouth      pravastatin (PRAVACHOL) 10 mg tablet TAKE 1 TABLET BY MOUTH DAILY AT BEDTIME 90 tablet 3    RITUXIMAB IV Infuse into a venous catheter      Calcium Citrate 250 MG TABS Take 4 tablets (1,000 mg total) by mouth daily  0     No current facility-administered medications for this visit  Allergies   Allergen Reactions    Infliximab Other (See Comments)     Drug induced lupus    Cimzia [Certolizumab Pegol] Other (See Comments)     Joint pain, headache, exhaustion    Abatacept Rash    Sulfasalazine Rash       Review of Systems   Constitutional: Positive for fatigue  Negative for chills and fever  Respiratory: Positive for cough (only in the morning)  Negative for shortness of breath  Cardiovascular: Negative for chest pain and palpitations  Gastrointestinal: Positive for abdominal pain and diarrhea  Genitourinary: Negative for difficulty urinating  There were no vitals filed for this visit        I spent 30 minutes directly with the patient during this visit    VIRTUAL VISIT Benignomathew Nick acknowledges that she has consented to an online visit or consultation  She understands that the online visit is based solely on information provided by her, and that, in the absence of a face-to-face physical evaluation by the physician, the diagnosis she receives is both limited and provisional in terms of accuracy and completeness  This is not intended to replace a full medical face-to-face evaluation by the physician  Susie Atkins understands and accepts these terms

## 2021-01-25 DIAGNOSIS — Z11.9 ENCOUNTER FOR SCREENING FOR INFECTIOUS AND PARASITIC DISEASES, UNSPECIFIED: ICD-10-CM

## 2021-01-25 PROCEDURE — U0003 INFECTIOUS AGENT DETECTION BY NUCLEIC ACID (DNA OR RNA); SEVERE ACUTE RESPIRATORY SYNDROME CORONAVIRUS 2 (SARS-COV-2) (CORONAVIRUS DISEASE [COVID-19]), AMPLIFIED PROBE TECHNIQUE, MAKING USE OF HIGH THROUGHPUT TECHNOLOGIES AS DESCRIBED BY CMS-2020-01-R: HCPCS | Performed by: INTERNAL MEDICINE

## 2021-01-25 PROCEDURE — U0005 INFEC AGEN DETEC AMPLI PROBE: HCPCS | Performed by: INTERNAL MEDICINE

## 2021-01-26 ENCOUNTER — TELEPHONE (OUTPATIENT)
Dept: INTERNAL MEDICINE CLINIC | Facility: CLINIC | Age: 61
End: 2021-01-26

## 2021-01-26 LAB — SARS-COV-2 RNA RESP QL NAA+PROBE: POSITIVE

## 2021-01-26 NOTE — TELEPHONE ENCOUNTER
Patent called again about this  She has no symptoms at all and doesn't understand how her test could come back positive  She wants to be retested  Please advise

## 2021-01-26 NOTE — TELEPHONE ENCOUNTER
Patient noticed today in her mychart she is positive for COVID  Patient said she has no symptoms and has been out and about  She is very concerned about her results  Patient is asking if she can be retested in case this is an error        Please advise

## 2021-01-27 ENCOUNTER — TELEPHONE (OUTPATIENT)
Dept: INTERNAL MEDICINE CLINIC | Facility: CLINIC | Age: 61
End: 2021-01-27

## 2021-01-27 ENCOUNTER — TELEMEDICINE (OUTPATIENT)
Dept: INTERNAL MEDICINE CLINIC | Facility: CLINIC | Age: 61
End: 2021-01-27
Payer: MEDICARE

## 2021-01-27 DIAGNOSIS — U07.1 COVID-19: Primary | ICD-10-CM

## 2021-01-27 PROCEDURE — 99443 PR PHYS/QHP TELEPHONE EVALUATION 21-30 MIN: CPT | Performed by: INTERNAL MEDICINE

## 2021-01-27 RX ORDER — ALBUTEROL SULFATE 90 UG/1
3 AEROSOL, METERED RESPIRATORY (INHALATION) ONCE AS NEEDED
Status: CANCELLED | OUTPATIENT
Start: 2021-01-28

## 2021-01-27 RX ORDER — ACETAMINOPHEN 325 MG/1
650 TABLET ORAL ONCE AS NEEDED
Status: CANCELLED | OUTPATIENT
Start: 2021-01-28

## 2021-01-27 RX ORDER — SODIUM CHLORIDE 9 MG/ML
20 INJECTION, SOLUTION INTRAVENOUS ONCE
Status: CANCELLED | OUTPATIENT
Start: 2021-01-28

## 2021-01-28 ENCOUNTER — HOSPITAL ENCOUNTER (OUTPATIENT)
Dept: INFUSION CENTER | Facility: HOSPITAL | Age: 61
Discharge: HOME | End: 2021-01-28
Payer: MEDICARE

## 2021-01-28 VITALS
TEMPERATURE: 97.7 F | SYSTOLIC BLOOD PRESSURE: 138 MMHG | DIASTOLIC BLOOD PRESSURE: 63 MMHG | RESPIRATION RATE: 16 BRPM | OXYGEN SATURATION: 99 % | HEART RATE: 60 BPM

## 2021-01-28 DIAGNOSIS — U07.1 COVID-19: Primary | ICD-10-CM

## 2021-01-28 PROCEDURE — M0243 CASIRIVI AND IMDEVI INFUSION: HCPCS | Performed by: INTERNAL MEDICINE

## 2021-01-28 RX ORDER — ACETAMINOPHEN 325 MG/1
650 TABLET ORAL ONCE AS NEEDED
Status: DISCONTINUED | OUTPATIENT
Start: 2021-01-28 | End: 2021-01-31 | Stop reason: HOSPADM

## 2021-01-28 RX ORDER — ALBUTEROL SULFATE 90 UG/1
3 AEROSOL, METERED RESPIRATORY (INHALATION) ONCE AS NEEDED
Status: DISCONTINUED | OUTPATIENT
Start: 2021-01-28 | End: 2021-01-31 | Stop reason: HOSPADM

## 2021-01-28 RX ORDER — ACETAMINOPHEN 325 MG/1
650 TABLET ORAL ONCE AS NEEDED
Status: CANCELLED | OUTPATIENT
Start: 2021-01-28

## 2021-01-28 RX ORDER — ALBUTEROL SULFATE 90 UG/1
3 AEROSOL, METERED RESPIRATORY (INHALATION) ONCE AS NEEDED
Status: CANCELLED | OUTPATIENT
Start: 2021-01-28

## 2021-01-28 RX ORDER — SODIUM CHLORIDE 9 MG/ML
20 INJECTION, SOLUTION INTRAVENOUS ONCE
Status: COMPLETED | OUTPATIENT
Start: 2021-01-28 | End: 2021-01-28

## 2021-01-28 RX ORDER — SODIUM CHLORIDE 9 MG/ML
20 INJECTION, SOLUTION INTRAVENOUS ONCE
Status: CANCELLED | OUTPATIENT
Start: 2021-01-28

## 2021-01-28 RX ADMIN — SODIUM CHLORIDE 20 ML/HR: 0.9 INJECTION, SOLUTION INTRAVENOUS at 12:41

## 2021-01-28 RX ADMIN — IMDEVIMAB: 300 INJECTION, SOLUTION, CONCENTRATE INTRAVENOUS at 12:42

## 2021-01-28 NOTE — PROGRESS NOTES
Observed pt for 60 mins; no adv reactions; discharge instructions reviewed with pt who verb understanding; pt left unit amb with steady gait

## 2021-01-28 NOTE — PROGRESS NOTES
Pt arrived on unit for Covid infusion; pt given EUA form and reviewed potential reactions; pt gives verbal consent to proceed proceed with treatment; IV inserted; VSS; will cont to monitor

## 2021-01-28 NOTE — PROGRESS NOTES
Spoke with Dr Jeanmarie Leblanc and Kiya Vick from pharmacy to make aware of patient's previous adverse reactions to MABs  OK to proceed with treatment

## 2021-01-29 ENCOUNTER — TELEMEDICINE (OUTPATIENT)
Dept: INTERNAL MEDICINE CLINIC | Facility: CLINIC | Age: 61
End: 2021-01-29
Payer: MEDICARE

## 2021-01-29 DIAGNOSIS — U07.1 COVID-19: Primary | ICD-10-CM

## 2021-01-29 PROCEDURE — 99442 PR PHYS/QHP TELEPHONE EVALUATION 11-20 MIN: CPT | Performed by: INTERNAL MEDICINE

## 2021-02-05 ENCOUNTER — TELEPHONE (OUTPATIENT)
Dept: INTERNAL MEDICINE CLINIC | Facility: CLINIC | Age: 61
End: 2021-02-05

## 2021-02-05 DIAGNOSIS — U07.1 COVID-19: Primary | ICD-10-CM

## 2021-02-05 NOTE — TELEPHONE ENCOUNTER
Patient is requesting the COVID antibody test   Patient states she was asymptomatic but tested positive        Please advise

## 2021-02-15 LAB — EXTERNAL SARS COV-2 ANITBODY IGM: NORMAL

## 2021-02-16 ENCOUNTER — TELEPHONE (OUTPATIENT)
Dept: INTERNAL MEDICINE CLINIC | Facility: CLINIC | Age: 61
End: 2021-02-16

## 2021-02-16 NOTE — TELEPHONE ENCOUNTER
The patient was diagnosed with covid on 1/26  She had the covid infusion on 1/28  The patient had the antibody test done and it came back non reactive  Could having the covid infusion cause the antibody test come back as non reactive? Was it a false positive test? How should she proceed? Does she still need to wait 90 day before she can have the covid vaccine? Please advise  Thank you  No

## 2021-02-16 NOTE — TELEPHONE ENCOUNTER
The infusion would not make the antibody test false negative  As for the COVID test being false positive, that is possible  She MUST wait 90 days before getting the vaccine because she received the infusion

## 2021-03-10 DIAGNOSIS — Z23 ENCOUNTER FOR IMMUNIZATION: ICD-10-CM

## 2021-03-16 DIAGNOSIS — G47.61 PLMD (PERIODIC LIMB MOVEMENT DISORDER): ICD-10-CM

## 2021-03-16 RX ORDER — CLONAZEPAM 1 MG/1
2 TABLET ORAL
Qty: 60 TABLET | Refills: 2 | Status: SHIPPED | OUTPATIENT
Start: 2021-03-16 | End: 2021-04-19 | Stop reason: SDUPTHER

## 2021-04-09 ENCOUNTER — CONSULT (OUTPATIENT)
Dept: INTERNAL MEDICINE CLINIC | Facility: CLINIC | Age: 61
End: 2021-04-09
Payer: MEDICARE

## 2021-04-09 VITALS
TEMPERATURE: 98.1 F | HEIGHT: 65 IN | BODY MASS INDEX: 22.73 KG/M2 | DIASTOLIC BLOOD PRESSURE: 90 MMHG | OXYGEN SATURATION: 99 % | WEIGHT: 136.4 LBS | SYSTOLIC BLOOD PRESSURE: 140 MMHG | HEART RATE: 66 BPM

## 2021-04-09 DIAGNOSIS — M05.79 RHEUMATOID ARTHRITIS INVOLVING MULTIPLE SITES WITH POSITIVE RHEUMATOID FACTOR (HCC): ICD-10-CM

## 2021-04-09 DIAGNOSIS — E61.1 IRON DEFICIENCY: ICD-10-CM

## 2021-04-09 DIAGNOSIS — M79.7 FIBROMYALGIA: ICD-10-CM

## 2021-04-09 DIAGNOSIS — K50.90 CROHN'S DISEASE WITHOUT COMPLICATION, UNSPECIFIED GASTROINTESTINAL TRACT LOCATION (HCC): ICD-10-CM

## 2021-04-09 DIAGNOSIS — R53.82 CHRONIC FATIGUE SYNDROME: ICD-10-CM

## 2021-04-09 DIAGNOSIS — G47.61 PLMD (PERIODIC LIMB MOVEMENT DISORDER): ICD-10-CM

## 2021-04-09 DIAGNOSIS — N20.0 URIC ACID RENAL CALCULUS: ICD-10-CM

## 2021-04-09 DIAGNOSIS — E78.5 DYSLIPIDEMIA: ICD-10-CM

## 2021-04-09 DIAGNOSIS — Z01.818 PREOP EXAM FOR INTERNAL MEDICINE: Primary | ICD-10-CM

## 2021-04-09 DIAGNOSIS — H02.403 PTOSIS OF BOTH EYELIDS: ICD-10-CM

## 2021-04-09 DIAGNOSIS — G35 MULTIPLE SCLEROSIS (HCC): ICD-10-CM

## 2021-04-09 PROCEDURE — 99213 OFFICE O/P EST LOW 20 MIN: CPT | Performed by: INTERNAL MEDICINE

## 2021-04-09 RX ORDER — DULOXETIN HYDROCHLORIDE 60 MG/1
60 CAPSULE, DELAYED RELEASE ORAL DAILY
Qty: 90 CAPSULE | Refills: 3 | Status: SHIPPED | OUTPATIENT
Start: 2021-04-09 | End: 2021-05-23 | Stop reason: SDUPTHER

## 2021-04-09 NOTE — PROGRESS NOTES
Assessment/Plan:  Medically stable for planned surgery         Problem List Items Addressed This Visit        Digestive    Crohn disease ([facility] [address] )       Nervous and Auditory    Chronic fatigue syndrome    Multiple sclerosis (HCC)       Musculoskeletal and Integument    Rheumatoid arthritis ([facility] [address] )       Genitourinary    Uric acid renal calculus       Other    PLMD (periodic limb movement disorder)    Relevant Medications    Ferrous Fumarate 325 (106 Fe) MG TABS    Dyslipidemia      Other Visit Diagnoses     Preop exam for internal medicine    -  Primary    Ptosis of both eyelids        Fibromyalgia        Relevant Medications    DULoxetine (CYMBALTA) 60 mg delayed release capsule    Iron deficiency        Relevant Medications    Ferrous Fumarate 325 (106 Fe) MG TABS            Subjective:      Patient ID: Torito Forbes is a 63 y o  female  HPI  Here for preop clearance  Scheduled for bilateral levator resection, bilateral lower lid blephroplasty on  4/21 Dr June Hines  Ptosis OU interfering with visual fields  No previous adverse reactions to anesthesia  She has had many surgeries in the past  Crohns disease rituximab, rheumatoid arthritis on hydroxychlorquine and hydrocortisone  Multiple sclerosis  She is transitioning to specialists at [facility] discontinued recently  Possibly starting leflunomide  She had COVID in January and received monoclonal antibody infusion  She has not had an illness since  Chronic fatigue, joint aches  Taking Cymbalta 50mg so has 2 different pills for this  Prefers to go back on 60mg (was trying to wean off)  Stays active and exercises regularly    The following portions of the patient's history were reviewed and updated as appropriate: allergies, current medications, past family history, past medical history, past social history, past surgical history and problem list     Review of Systems   Constitutional: Negative for chills, fever and unexpected weight change     HENT: Negative for congestion and rhinorrhea  Eyes: Positive for visual disturbance  Respiratory: Negative for cough and shortness of breath  Cardiovascular: Negative for chest pain and palpitations  Gastrointestinal: Positive for abdominal pain and diarrhea  Genitourinary: Negative for difficulty urinating and dysuria  Musculoskeletal: Positive for arthralgias and myalgias  Objective:      /90   Pulse 66   Temp 98 1 °F (36 7 °C) (Temporal)   Ht 5' 5" (1 651 m)   Wt 61 9 kg (136 lb 6 4 oz)   SpO2 99%   BMI 22 70 kg/m²          Physical Exam  Constitutional:       General: She is not in acute distress  Appearance: Normal appearance  She is well-developed  She is not ill-appearing or diaphoretic  HENT:      Head: Normocephalic and atraumatic  Eyes:      Conjunctiva/sclera: Conjunctivae normal    Neck:      Musculoskeletal: Neck supple  Cardiovascular:      Rate and Rhythm: Normal rate and regular rhythm  Heart sounds: Normal heart sounds  No murmur  Pulmonary:      Effort: Pulmonary effort is normal  No respiratory distress  Breath sounds: Normal breath sounds  No wheezing or rales  Abdominal:      General: There is no distension  Palpations: Abdomen is soft  There is no mass  Tenderness: There is generalized abdominal tenderness  There is no guarding or rebound  Musculoskeletal:      Right lower leg: No edema  Left lower leg: No edema  Skin:     General: Skin is warm and dry  Neurological:      Mental Status: She is alert and oriented to person, place, and time  Psychiatric:         Mood and Affect: Mood normal          Behavior: Behavior normal          Thought Content:  Thought content normal          Judgment: Judgment normal

## 2021-04-19 DIAGNOSIS — G47.61 PLMD (PERIODIC LIMB MOVEMENT DISORDER): ICD-10-CM

## 2021-04-20 RX ORDER — CLONAZEPAM 1 MG/1
2 TABLET ORAL
Qty: 60 TABLET | Refills: 0 | Status: SHIPPED | OUTPATIENT
Start: 2021-04-20 | End: 2021-05-23 | Stop reason: SDUPTHER

## 2021-05-23 DIAGNOSIS — E53.8 B12 DEFICIENCY: ICD-10-CM

## 2021-05-23 DIAGNOSIS — M79.7 FIBROMYALGIA: ICD-10-CM

## 2021-05-23 DIAGNOSIS — G47.61 PLMD (PERIODIC LIMB MOVEMENT DISORDER): ICD-10-CM

## 2021-05-25 PROBLEM — E53.8 B12 DEFICIENCY: Status: ACTIVE | Noted: 2021-05-25

## 2021-05-25 RX ORDER — DULOXETIN HYDROCHLORIDE 60 MG/1
60 CAPSULE, DELAYED RELEASE ORAL DAILY
Qty: 90 CAPSULE | Refills: 0 | Status: SHIPPED | OUTPATIENT
Start: 2021-05-25 | End: 2021-09-03 | Stop reason: SDUPTHER

## 2021-05-25 RX ORDER — CLONAZEPAM 1 MG/1
2 TABLET ORAL
Qty: 60 TABLET | Refills: 0 | Status: SHIPPED | OUTPATIENT
Start: 2021-05-25 | End: 2021-06-16 | Stop reason: SDUPTHER

## 2021-05-25 RX ORDER — SYRINGE WITH NEEDLE, 1 ML 25GX5/8"
SYRINGE, EMPTY DISPOSABLE MISCELLANEOUS
Qty: 12 EACH | Refills: 0 | Status: SHIPPED | OUTPATIENT
Start: 2021-05-25 | End: 2021-11-18 | Stop reason: SDUPTHER

## 2021-07-15 NOTE — PROGRESS NOTES
7/15/2021  Pt seen today with Dr Renu Schultz initial visit for breast cancer. Currently seeing Dr Renu Schultz for surveillance for colon cancer. Pt with lymphedema to [facility], currently in oncology rehab. Dr Renu Schultz reviewed pathology, recent scans, treatment options and next steps with questions/concerns answered. Due for thoracentesis tomorrow morning. Will schedule a Pet scan for further eval and FU with pt in 2 weeks to discuss treatment options. Navigation nformation given to pt, encouraged to call with any questions/concerns. Navigation will continue to follow. COVID-19 Virtual Visit     Assessment/Plan:    Problem List Items Addressed This Visit        Other    COVID-19 - Primary         Disposition:     I recommended continued isolation until at least 24 hours have passed since recovery defined as resolution of fever without the use of fever-reducing medications AND improvement in COVID symptoms AND 10 days have passed since onset of symptoms (or 10 days have passed since date of first positive viral diagnostic test for asymptomatic patients)  [address]    Your healthcare provider and/or public health staff have evaluated you and have determined that you do not need to remain in the hospital at this time   At this time you can be isolated at home where you will be monitored by staff from your local or state health department  You should carefully follow the prevention and isolation steps below until a healthcare provider or local or state health department says that you can return to your normal activities  Stay home except to get medical care    People who are mildly ill with COVID-19 are able to isolate at home during their illness  You should restrict activities outside your home, except for getting medical care  Do not go to work, school, or public areas  Avoid using public transportation, ride-sharing, or taxis  Separate yourself from other people and animals in your home    People: As much as possible, you should stay in a specific room and away from other people in your home  Also, you should use a separate bathroom, if available  Animals: You should restrict contact with pets and other animals while you are sick with COVID-19, just like you would around other people  Although there have not been reports of pets or other animals becoming sick with COVID-19, it is still recommended that people sick with COVID-19 limit contact with animals until more information is known about the virus   When possible, have another member of your household care for your animals while you are sick  If you are sick with COVID-19, avoid contact with your pet, including petting, snuggling, being kissed or licked, and sharing food  If you must care for your pet or be around animals while you are sick, wash your hands before and after you interact with pets and wear a facemask  See COVID-19 and Animals for more information  Call ahead before visiting your doctor    If you have a medical appointment, call the healthcare provider and tell them that you have or may have COVID-19  This will help the healthcare providers office take steps to keep other people from getting infected or exposed  Wear a facemask    You should wear a facemask when you are around other people (e g , sharing a room or vehicle) or pets and before you enter a healthcare providers office  If you are not able to wear a facemask (for example, because it causes trouble breathing), then people who live with you should not stay in the same room with you, or they should wear a facemask if they enter your room  Cover your coughs and sneezes    Cover your mouth and nose with a tissue when you cough or sneeze  Throw used tissues in a lined trash can  Immediately wash your hands with soap and water for at least 20 seconds or, if soap and water are not available, clean your hands with an alcohol-based hand  that contains at least 60% alcohol  Clean your hands often    Wash your hands often with soap and water for at least 20 seconds, especially after blowing your nose, coughing, or sneezing; going to the bathroom; and before eating or preparing food  If soap and water are not readily available, use an alcohol-based hand  with at least 60% alcohol, covering all surfaces of your hands and rubbing them together until they feel dry  Soap and water are the best option if hands are visibly dirty  Avoid touching your eyes, nose, and mouth with unwashed hands      Avoid sharing personal household items    You should not share dishes, drinking glasses, cups, eating utensils, towels, or bedding with other people or pets in your home  After using these items, they should be washed thoroughly with soap and water  Clean all high-touch surfaces everyday    High touch surfaces include counters, tabletops, doorknobs, bathroom fixtures, toilets, phones, keyboards, tablets, and bedside tables  Also, clean any surfaces that may have blood, stool, or body fluids on them  Use a household cleaning spray or wipe, according to the label instructions  Labels contain instructions for safe and effective use of the cleaning product including precautions you should take when applying the product, such as wearing gloves and making sure you have good ventilation during use of the product  Monitor your symptoms    Seek prompt medical attention if your illness is worsening (e g , difficulty breathing)  Before seeking care, call your healthcare provider and tell them that you have, or are being evaluated for, COVID-19  Put on a facemask before you enter the facility  These steps will help the healthcare providers office to keep other people in the office or waiting room from getting infected or exposed  Ask your healthcare provider to call the local or [facility] health department  Persons who are placed under active monitoring or facilitated self-monitoring should follow instructions provided by their local health department or occupational health professionals, as appropriate  If you have a medical emergency and need to call 911, notify the dispatch personnel that you have, or are being evaluated for COVID-19  If possible, put on a facemask before emergency medical services arrive  Discontinuing home isolation    Patients with confirmed COVID-19 should remain under home isolation precautions until the following conditions are met:    They have had no fever for at least 24 hours (that is one full day of no fever without the use medicine that reduces fevers)  AND  other symptoms have improved (for example, when their cough or shortness of breath have improved)  AND  If had mild or moderate illness, at least 10 days have passed since their symptoms first appeared or if severe illness (needed oxygen) or immunosuppressed, at least 20 days have passed since symptoms first appeared  Patients with confirmed COVID-19 should also notify close contacts (including their workplace) and ask that they self-quarantine  Currently, close contact is defined as being within 6 feet for 15 minutes or more from the period 24 hours starting 48 hours before symptom onset to the time at which the patient went into isolation   Close contacts of patients diagnosed with COVID-19 should be instructed by the patient to self-quarantine for 14 days from the last time of their last contact with the patient  Source: [employer] I have spent 15 minutes directly with the patient  Greater than 50% of this time was spent in counseling/coordination of care regarding: instructions for management  Encounter provider Arnulfo Deng MD    Provider located at [address] [place] [id number]    Recent Visits  Date Type Provider Dept   01/27/21 Telephone MD [facility] [address] [address] 01/27/21 Telemedicine MD [facility] [address] [address] 01/26/21 Telephone MD [facility] [address] 01/22/21 [facility]medicine [facility] recent visits within past 7 days and meeting all other requirements     Today's Visits  Date Type Provider Dept   01/29/21 Telemedicine MD [address] today's visits and meeting all other requirements     Future Appointments  No visits were found meeting these conditions     Showing future appointments within next 150 days and meeting all other requirements        Patient agrees to participate in a virtual check in via telephone or video visit instead of presenting to the office to address urgent/immediate medical needs  Patient is aware this is a billable service  After connecting through Telephone, the patient was identified by name and date of birth  Tati Forde was informed that this was a telemedicine visit and that the exam was being conducted confidentially over secure lines  My office door was closed  No one else was in the room  Tati Forde acknowledged consent and understanding of privacy and security of the telemedicine visit  I informed the patient that I have reviewed her record in Epic and presented the opportunity for her to ask any questions regarding the visit today  The patient agreed to participate  Subjective:   Tati Forde is a 63 y o  female who has been screened for COVID-19  Symptom change since last report: improving  Patient's symptoms include fatigue, abdominal pain, diarrhea and headache  Patient denies fever, chills, congestion, rhinorrhea, sore throat, anosmia, loss of taste, cough, shortness of breath, nausea, vomiting and myalgias  Savana Feliz has been staying home and has isolated themselves in her home  She is taking care to not share personal items and is cleaning all surfaces that are touched often, like counters, tabletops, and doorknobs using household cleaning sprays or wipes  She is wearing a mask when she leaves her room       Date of symptom onset: 1/27/2021  Date of positive COVID-19 PCR: 1/25/2021    Monoclonal Antibody Follow-up Symptom Questionnaire  I feel overall: somewhat better  My breathing is: similar  My fever is: same  My fatigue is: similar    Lab Results   Component Value Date    SARSCOV2 Positive (A) 01/25/2021    Makenzie Wiley Not Detected 11/20/2020     Past Medical History:   Diagnosis Date    Acute cystitis with hematuria 5/25/2020    Atrial fibrillation ([facility] [address] ) 10/20/2017    Overview:  10/2017- 1 5 hour run of Afib while in ED w/ abdominal pain  No prior hx  Last Assessment & Plan:  SR today, CHADSVASC=0- taking ASA 81mg  Normal exam today   Concussion with brief LOC     WITH LOC, 30 MIN OR LESS, SEQUELA; LAST ASSESSED: AND RESOLVED 8/21/17    Exposure to COVID-19 virus 11/22/2020    Exposure to SARS-associated coronavirus 7/19/2020    Fatigue     LAST ASSESSED: AND RESOLVED: 6/26/16    Upper respiratory tract infection 1/9/2020     No past surgical history on file  Current Outpatient Medications   Medication Sig Dispense Refill    acetaminophen (TYLENOL) 500 mg tablet Take 500 mg by mouth every 6 (six) hours      amphetamine-dextroamphetamine (ADDERALL) 5 MG tablet Take 5 mg by mouth 2 (two) times a day      aspirin 81 MG tablet Take 81 mg by mouth      azaTHIOprine (IMURAN) 50 mg tablet Take 100 mg by mouth daily       B-D 3CC LUER-RITESH SYR 23GX1" 23G X 1" 3 ML MISC USE AS DIRECTED  12 each 3    Calcium Citrate 250 MG TABS Take 4 tablets (1,000 mg total) by mouth daily  0    Cholecalciferol 2000 units CAPS Take 5,000 Units by mouth daily      clonazePAM (KlonoPIN) 1 mg tablet TAKE 2 TABLETS (2 MG TOTAL) BY MOUTH DAILY AT BEDTIME 60 tablet 2    colestipol (COLESTID) 1 g tablet Take 4 tablets (4 g total) by mouth daily      cyanocobalamin 1,000 mcg/mL Inject 1 mL (1,000 mcg total) under the skin every 14 (fourteen) days 10 mL 1    cyclobenzaprine (FLEXERIL) 10 mg tablet cyclobenzaprine 10 mg tablet      DULoxetine (CYMBALTA) 20 mg capsule TAKE 1 CAPSULE BY MOUTH WITH 30 MG (Patient taking differently: Take 50 mg by mouth daily TAKE 1 CAPSULE BY MOUTH WITH 30 MG) 90 capsule 0    DULoxetine (CYMBALTA) 30 mg delayed release capsule 50 mg   2    estradiol (VAGIFEM, YUVAFEM) 10 MCG TABS vaginal tablet Insert 1 tab intravaginally daily x 2 weeks; then twice weekly        Ferrous Fumarate 325 (106 Fe) MG TABS Take 5 mL by mouth every other day Patient takes 125 mg daily (Patient taking differently: Take 5 mL by mouth once a week Patient takes 125 mg daily)      folic acid (FOLVITE) 1 mg tablet Take 1 mg by mouth daily       hydrocortisone (CORTEF) 5 mg tablet Take 20 mg by mouth daily      hydroxychloroquine (PLAQUENIL) 200 mg tablet Take 200 mg by mouth 2 (two) times a day      Magnesium Gluconate 27 5 MG TABS Take 400 mg by mouth daily       medroxyPROGESTERone (PROVERA) 2 5 mg tablet medroxyprogesterone 2 5 mg tablet      potassium citrate (UROCIT-K) 10 mEq Take 10 mEq by mouth      pravastatin (PRAVACHOL) 10 mg tablet TAKE 1 TABLET BY MOUTH DAILY AT BEDTIME 90 tablet 3    RITUXIMAB IV Infuse into a venous catheter       No current facility-administered medications for this visit  Facility-Administered Medications Ordered in Other Visits   Medication Dose Route Frequency Provider Last Rate Last Admin    acetaminophen (TYLENOL) tablet 650 mg  650 mg Oral Once PRN Maira MD Magen        albuterol (PROVENTIL HFA,VENTOLIN HFA) inhaler 3 puff  3 puff Inhalation Once PRN Maira MD Magen         Allergies   Allergen Reactions    Infliximab Other (See Comments)     Drug induced lupus    Cimzia [Certolizumab Pegol] Other (See Comments)     Joint pain, headache, exhaustion    Abatacept Rash    Sulfasalazine Rash       Review of Systems   Constitutional: Positive for fatigue  Negative for chills and fever  HENT: Negative for congestion, rhinorrhea and sore throat  Respiratory: Negative for cough and shortness of breath  Gastrointestinal: Positive for abdominal pain and diarrhea  Negative for nausea and vomiting  Musculoskeletal: Negative for myalgias  Neurological: Positive for headaches  Objective: There were no vitals filed for this visit  Physical Exam  VIRTUAL VISIT DISCLAIMER    Avelino River acknowledges that she has consented to an online visit or consultation   She understands that the online visit is based solely on information provided by her, and that, in the absence of a face-to-face physical evaluation by the physician, the diagnosis she receives is both limited and provisional in terms of accuracy and completeness  This is not intended to replace a full medical face-to-face evaluation by the physician  Marie Mascorro understands and accepts these terms

## 2021-08-31 ENCOUNTER — TELEPHONE (OUTPATIENT)
Dept: INTERNAL MEDICINE CLINIC | Facility: CLINIC | Age: 61
End: 2021-08-31

## 2021-08-31 NOTE — TELEPHONE ENCOUNTER
That is not treated with Valtrex alone  She must be seen immediately buy an ophthalmologist if that is what she has   She should call her ophthalmologist to schedule an appt immediately

## 2021-08-31 NOTE — TELEPHONE ENCOUNTER
Pt called to advise that she thinks she has shingles in her eye   She asked for an RX for Valtrex to be sent to [facility] in [place]

## 2021-09-03 ENCOUNTER — TELEPHONE (OUTPATIENT)
Dept: INTERNAL MEDICINE CLINIC | Facility: CLINIC | Age: 61
End: 2021-09-03

## 2021-09-03 NOTE — TELEPHONE ENCOUNTER
Patient saw the eye doctor and everything is fine  No active shingles  They did prescribe the valtrex  Patient is asking if you can change her lower her Cymbalta  Feels jittery on this dose  Asking to go back down to 50 mg  Patient is requesting a script for the 30 mg and 20 mg tablets    Pharmacy-[facility]      Please advise

## 2021-09-30 ENCOUNTER — OFFICE VISIT (OUTPATIENT)
Dept: INTERNAL MEDICINE CLINIC | Facility: CLINIC | Age: 61
End: 2021-09-30
Payer: MEDICARE

## 2021-09-30 VITALS
OXYGEN SATURATION: 98 % | BODY MASS INDEX: 23.72 KG/M2 | WEIGHT: 142.4 LBS | HEIGHT: 65 IN | SYSTOLIC BLOOD PRESSURE: 138 MMHG | DIASTOLIC BLOOD PRESSURE: 76 MMHG | HEART RATE: 76 BPM

## 2021-09-30 DIAGNOSIS — M79.7 FIBROMYALGIA: ICD-10-CM

## 2021-09-30 DIAGNOSIS — M05.79 RHEUMATOID ARTHRITIS INVOLVING MULTIPLE SITES WITH POSITIVE RHEUMATOID FACTOR (HCC): ICD-10-CM

## 2021-09-30 DIAGNOSIS — R53.82 CHRONIC FATIGUE SYNDROME: ICD-10-CM

## 2021-09-30 DIAGNOSIS — Z23 NEED FOR VACCINATION: ICD-10-CM

## 2021-09-30 DIAGNOSIS — Z00.00 MEDICARE ANNUAL WELLNESS VISIT, SUBSEQUENT: Primary | ICD-10-CM

## 2021-09-30 DIAGNOSIS — E78.5 DYSLIPIDEMIA: ICD-10-CM

## 2021-09-30 DIAGNOSIS — E61.1 IRON DEFICIENCY: ICD-10-CM

## 2021-09-30 DIAGNOSIS — Z01.84 IMMUNITY STATUS TESTING: ICD-10-CM

## 2021-09-30 DIAGNOSIS — E55.9 VITAMIN D DEFICIENCY: ICD-10-CM

## 2021-09-30 DIAGNOSIS — B02.29 POST HERPETIC NEURALGIA: ICD-10-CM

## 2021-09-30 DIAGNOSIS — G47.61 PLMD (PERIODIC LIMB MOVEMENT DISORDER): ICD-10-CM

## 2021-09-30 PROBLEM — G35 MULTIPLE SCLEROSIS (HCC): Status: RESOLVED | Noted: 2020-02-12 | Resolved: 2021-09-30

## 2021-09-30 PROBLEM — R14.0 ABDOMINAL BLOATING: Status: RESOLVED | Noted: 2020-11-17 | Resolved: 2021-09-30

## 2021-09-30 PROBLEM — U07.1 COVID-19: Status: RESOLVED | Noted: 2021-01-27 | Resolved: 2021-09-30

## 2021-09-30 PROCEDURE — G0439 PPPS, SUBSEQ VISIT: HCPCS | Performed by: INTERNAL MEDICINE

## 2021-09-30 PROCEDURE — 99214 OFFICE O/P EST MOD 30 MIN: CPT | Performed by: INTERNAL MEDICINE

## 2021-09-30 PROCEDURE — 90682 RIV4 VACC RECOMBINANT DNA IM: CPT

## 2021-09-30 PROCEDURE — G0008 ADMIN INFLUENZA VIRUS VAC: HCPCS

## 2021-09-30 RX ORDER — DULOXETIN HYDROCHLORIDE 60 MG/1
60 CAPSULE, DELAYED RELEASE ORAL DAILY
Start: 2021-09-30 | End: 2021-12-06

## 2021-09-30 RX ORDER — GABAPENTIN 100 MG/1
CAPSULE ORAL
Qty: 90 CAPSULE | Refills: 1 | Status: SHIPPED | OUTPATIENT
Start: 2021-09-30 | End: 2022-04-25

## 2021-10-03 PROBLEM — E55.9 VITAMIN D DEFICIENCY: Status: ACTIVE | Noted: 2021-10-03

## 2021-10-03 PROBLEM — M79.7 FIBROMYALGIA: Status: ACTIVE | Noted: 2021-10-03

## 2021-10-03 PROBLEM — E61.1 IRON DEFICIENCY: Status: ACTIVE | Noted: 2021-10-03

## 2021-10-03 PROBLEM — B02.29 POST HERPETIC NEURALGIA: Status: ACTIVE | Noted: 2021-10-03

## 2021-10-20 LAB
25(OH)D3 SERPL-MCNC: 55 NG/ML (ref 30–100)
CHOLEST SERPL-MCNC: 178 MG/DL
CHOLEST/HDLC SERPL: 2.7 (CALC)
FERRITIN SERPL-MCNC: 58 NG/ML (ref 16–288)
HDLC SERPL-MCNC: 67 MG/DL
LDLC SERPL CALC-MCNC: 81 MG/DL (CALC)
NONHDLC SERPL-MCNC: 111 MG/DL (CALC)
SARS-COV-2 IGG SERPL QL IA: NEGATIVE
SARS-COV-2 IGM SERPL QL IA: NEGATIVE
TRIGL SERPL-MCNC: 207 MG/DL

## 2021-10-23 DIAGNOSIS — E78.1 ESSENTIAL HYPERTRIGLYCERIDEMIA: ICD-10-CM

## 2021-10-23 DIAGNOSIS — E53.8 B12 DEFICIENCY: ICD-10-CM

## 2021-10-23 RX ORDER — PRAVASTATIN SODIUM 10 MG
TABLET ORAL
Qty: 90 TABLET | Refills: 3 | Status: SHIPPED | OUTPATIENT
Start: 2021-10-23

## 2021-10-23 RX ORDER — CYANOCOBALAMIN 1000 UG/ML
INJECTION INTRAMUSCULAR; SUBCUTANEOUS
Qty: 6 ML | Refills: 3 | Status: SHIPPED | OUTPATIENT
Start: 2021-10-23 | End: 2022-06-17 | Stop reason: SDUPTHER

## 2021-11-18 DIAGNOSIS — E53.8 B12 DEFICIENCY: ICD-10-CM

## 2021-11-18 RX ORDER — SYRINGE WITH NEEDLE, 1 ML 25GX5/8"
SYRINGE, EMPTY DISPOSABLE MISCELLANEOUS
Qty: 50 EACH | Refills: 0 | Status: SHIPPED | OUTPATIENT
Start: 2021-11-18

## 2021-11-27 DIAGNOSIS — G47.61 PLMD (PERIODIC LIMB MOVEMENT DISORDER): ICD-10-CM

## 2021-11-28 RX ORDER — CLONAZEPAM 1 MG/1
2 TABLET ORAL
Qty: 60 TABLET | Refills: 2 | Status: SHIPPED | OUTPATIENT
Start: 2021-11-28 | End: 2022-03-27

## 2021-11-29 RX ORDER — CLONAZEPAM 1 MG/1
2 TABLET ORAL
Qty: 60 TABLET | Refills: 0 | Status: SHIPPED | OUTPATIENT
Start: 2021-11-29 | End: 2021-12-27

## 2021-12-06 ENCOUNTER — TELEPHONE (OUTPATIENT)
Dept: INTERNAL MEDICINE CLINIC | Facility: CLINIC | Age: 61
End: 2021-12-06

## 2021-12-23 ENCOUNTER — RA CDI HCC (OUTPATIENT)
Dept: OTHER | Facility: HOSPITAL | Age: 61
End: 2021-12-23

## 2021-12-27 RX ORDER — LEFLUNOMIDE 10 MG/1
TABLET ORAL
COMMUNITY
Start: 2021-12-03 | End: 2021-12-29

## 2021-12-27 RX ORDER — MEDROXYPROGESTERONE ACETATE 2.5 MG/1
2.5 TABLET ORAL
COMMUNITY
Start: 2021-08-12

## 2021-12-28 ENCOUNTER — TELEPHONE (OUTPATIENT)
Dept: ADMINISTRATIVE | Facility: OTHER | Age: 61
End: 2021-12-28

## 2021-12-29 ENCOUNTER — CONSULT (OUTPATIENT)
Dept: INTERNAL MEDICINE CLINIC | Facility: CLINIC | Age: 61
End: 2021-12-29
Payer: MEDICARE

## 2021-12-29 VITALS
OXYGEN SATURATION: 98 % | HEIGHT: 65 IN | DIASTOLIC BLOOD PRESSURE: 72 MMHG | BODY MASS INDEX: 23.19 KG/M2 | WEIGHT: 139.2 LBS | SYSTOLIC BLOOD PRESSURE: 118 MMHG | RESPIRATION RATE: 16 BRPM | HEART RATE: 71 BPM

## 2021-12-29 DIAGNOSIS — B02.9 HERPES ZOSTER WITHOUT COMPLICATION: ICD-10-CM

## 2021-12-29 DIAGNOSIS — Z12.31 ENCOUNTER FOR SCREENING MAMMOGRAM FOR BREAST CANCER: ICD-10-CM

## 2021-12-29 DIAGNOSIS — Z01.818 PREOPERATIVE CLEARANCE: Primary | ICD-10-CM

## 2021-12-29 DIAGNOSIS — Z98.890 HX OF BLEPHAROPLASTY: ICD-10-CM

## 2021-12-29 PROCEDURE — 99213 OFFICE O/P EST LOW 20 MIN: CPT | Performed by: NURSE PRACTITIONER

## 2021-12-29 RX ORDER — VALACYCLOVIR HYDROCHLORIDE 1 G/1
TABLET, FILM COATED ORAL
Qty: 12 TABLET | Refills: 2 | Status: SHIPPED | OUTPATIENT
Start: 2021-12-29 | End: 2022-04-22

## 2021-12-29 NOTE — PROGRESS NOTES
Assessment/Plan:    Preoperative clearance  Cleared for surgery   Reviewed recent bloodwork- everything was unremarkeable  Form filled and faxed       Diagnoses and all orders for this visit:    Preoperative clearance    Encounter for screening mammogram for breast cancer  -     Mammo screening bilateral w 3d & cad; Future    Herpes zoster without complication  -     valACYclovir (VALTREX) 1,000 mg tablet; Take one tab twice a day for three days with onset of shingles    Hx of blepharoplasty    Other orders  -     Discontinue: leflunomide (ARAVA) 10 MG tablet  -     medroxyPROGESTERone (PROVERA) 2 5 mg tablet; Take 2 5 mg by mouth          Subjective:      Patient ID: Leticia Stewart is a 57 y o  female  Patient is here for preop clearance for blepharoplasty with dr Jose Alfredo Macedo MD  Vitals are WNL  She complains of mild tingling  burning of left forehead/scalp  She has frequent post herpetic neuralgia  Gabapentin was ineffective  The only medication that helps to control a flare up is valacyclovir      The following portions of the patient's history were reviewed and updated as appropriate: allergies, current medications, past family history, past medical history, past social history, past surgical history and problem list     Review of Systems   Constitutional: Negative  HENT: Negative  Eyes: Negative  Respiratory: Negative  Cardiovascular: Negative  Gastrointestinal: Negative  Musculoskeletal: Negative  Neurological: Negative  Objective:      /72   Pulse 71   Resp 16   Ht 5' 5" (1 651 m)   Wt 63 1 kg (139 lb 3 2 oz)   SpO2 98%   BMI 23 16 kg/m²          Physical Exam  Vitals and nursing note reviewed  Constitutional:       Appearance: She is well-developed  HENT:      Head: Normocephalic and atraumatic        Right Ear: External ear normal       Left Ear: External ear normal       Nose: Nose normal    Eyes:      Conjunctiva/sclera: Conjunctivae normal       Pupils: Pupils are equal, round, and reactive to light  Cardiovascular:      Rate and Rhythm: Normal rate and regular rhythm  Pulmonary:      Effort: Pulmonary effort is normal       Breath sounds: Normal breath sounds  Abdominal:      General: Bowel sounds are normal       Palpations: Abdomen is soft  Musculoskeletal:         General: Normal range of motion  Cervical back: Normal range of motion and neck supple  Skin:     General: Skin is warm and dry  Neurological:      Mental Status: She is alert and oriented to person, place, and time

## 2022-01-06 PROBLEM — Z01.818 PREOPERATIVE CLEARANCE: Status: ACTIVE | Noted: 2022-01-06

## 2022-01-06 NOTE — ASSESSMENT & PLAN NOTE
Cleared for surgery   Reviewed recent bloodwork- everything was unremarkeable  Form filled and faxed

## 2022-03-27 DIAGNOSIS — G47.61 PLMD (PERIODIC LIMB MOVEMENT DISORDER): ICD-10-CM

## 2022-03-27 RX ORDER — CLONAZEPAM 1 MG/1
2 TABLET ORAL
Qty: 60 TABLET | Refills: 0 | Status: SHIPPED | OUTPATIENT
Start: 2022-03-27

## 2022-04-07 ENCOUNTER — RA CDI HCC (OUTPATIENT)
Dept: OTHER | Facility: HOSPITAL | Age: 62
End: 2022-04-07

## 2022-04-07 NOTE — PROGRESS NOTES
Moted "does not have MS"  [facility]  coding opportunities       Chart reviewed, no opportunity found:   [address]        Patients Insurance     David Ellison Insurance: [employer]         9/30/21

## 2022-04-22 ENCOUNTER — TELEMEDICINE (OUTPATIENT)
Dept: INTERNAL MEDICINE CLINIC | Facility: CLINIC | Age: 62
End: 2022-04-22
Payer: MEDICARE

## 2022-04-22 VITALS
HEIGHT: 65 IN | BODY MASS INDEX: 22.49 KG/M2 | HEART RATE: 73 BPM | WEIGHT: 135 LBS | TEMPERATURE: 97.3 F | OXYGEN SATURATION: 99 %

## 2022-04-22 DIAGNOSIS — E55.9 VITAMIN D DEFICIENCY: ICD-10-CM

## 2022-04-22 DIAGNOSIS — M05.79 RHEUMATOID ARTHRITIS INVOLVING MULTIPLE SITES WITH POSITIVE RHEUMATOID FACTOR (HCC): Primary | ICD-10-CM

## 2022-04-22 DIAGNOSIS — E78.5 DYSLIPIDEMIA: ICD-10-CM

## 2022-04-22 DIAGNOSIS — E61.1 IRON DEFICIENCY: ICD-10-CM

## 2022-04-22 DIAGNOSIS — L40.50 PSORIATIC ARTHRITIS (HCC): ICD-10-CM

## 2022-04-22 PROCEDURE — 99214 OFFICE O/P EST MOD 30 MIN: CPT | Performed by: INTERNAL MEDICINE

## 2022-04-22 RX ORDER — PREDNISONE 10 MG/1
10 TABLET ORAL DAILY
Start: 2022-04-22

## 2022-04-22 NOTE — PROGRESS NOTES
Virtual Regular Visit    Verification of patient location:    Patient is located in the following state in which I hold an active license Other; [facility]      Assessment/Plan:  She will look into getting [facility] where she gets her Rituxan    Problem List Items Addressed This Visit        Musculoskeletal and Integument    Rheumatoid arthritis ([address] ) - Primary    Relevant Medications    Methotrexate 15 MG/0 6ML SOSY    predniSONE 10 mg tablet    Psoriatic arthritis (HCC)    Relevant Medications    predniSONE 10 mg tablet       Other    Dyslipidemia    Relevant Orders    Lipid Panel with Direct LDL reflex    Vitamin D deficiency    Relevant Orders    Vitamin D 25 hydroxy    Iron deficiency    Relevant Orders    Ferritin               Reason for visit is   Chief Complaint   Patient presents with    Follow-up    Virtual Regular Visit        Encounter provider Jordon Faulkner MD    Provider located at [address] [place] [id number]      Recent Visits  No visits were found meeting these conditions  Showing recent visits within past 7 days and meeting all other requirements  Today's Visits  Date Type Provider Dept   04/22/22 Telemedicine Jordon Faulkner MD [address] [facility] today's visits and meeting all other requirements  Future Appointments  No visits were found meeting these conditions  Showing future appointments within next 150 days and meeting all other requirements       The patient was identified by name and date of birth  Rita Price was informed that this is a telemedicine visit and that the visit is being conducted through [facility] and patient was informed this is a secure, HIPAA-complaint platform  She agrees to proceed     My office door was closed  No one else was in the room  She acknowledged consent and understanding of privacy and security of the video platform   The patient has agreed to participate and understands they can discontinue the visit at any time  Patient is aware this is a billable service  Subjective  Tati Forde is a 57 y o  female here for f/u   HPI   Received Rituxan a few days ago and is feeling even more tired  Second bout of COVID in February and received Strovimab  She was getting screened every 2 weeks  Her only symptoms was SOB with strenuous activity that she noticed when she did yoga  She continues to stay active  Increase swelling in her hands that is diagnosed as psoriatic arthritis  She is on prednisone  (switched from hydrocortisone) and MTX increased  MS on Rituxan  Past Medical History:   Diagnosis Date    Abdominal bloating 11/17/2020    Acute cystitis with hematuria 5/25/2020    Atrial fibrillation ([facility] ) 10/20/2017    Overview:  10/2017- 1 5 hour run of Afib while in ED w/ abdominal pain  No prior hx  Last Assessment & Plan:  SR today, CHADSVASC=0- taking ASA 81mg  Normal exam today       Concussion with brief LOC     WITH LOC, 30 MIN OR LESS, SEQUELA; LAST ASSESSED: AND RESOLVED 8/21/17    COVID-19 1/27/2021    Exposure to COVID-19 virus 11/22/2020    Exposure to SARS-associated coronavirus 7/19/2020    Fatigue     LAST ASSESSED: AND RESOLVED: 6/26/16    Multiple sclerosis ([facility] ) 2/12/2020    Upper respiratory tract infection 1/9/2020       Past Surgical History:   Procedure Laterality Date    JOINT REPLACEMENT         Current Outpatient Medications   Medication Sig Dispense Refill    acetaminophen (TYLENOL) 500 mg tablet Take 500 mg by mouth every 6 (six) hours      amphetamine-dextroamphetamine (ADDERALL) 5 MG tablet Take 5 mg by mouth 2 (two) times a day      aspirin 81 MG tablet Take 81 mg by mouth      Calcium Citrate 250 MG TABS Take 4 tablets (1,000 mg total) by mouth daily (Patient taking differently: Take 250 mg by mouth daily  )  0    Cholecalciferol 2000 units CAPS Take 5,000 Units by mouth daily      clonazePAM (KlonoPIN) 1 mg tablet TAKE 2 TABLETS (2 MG TOTAL) BY MOUTH DAILY AT BEDTIME 60 tablet 0    cyanocobalamin 1,000 mcg/mL INJECT 1 ML UNDER THE SKIN EVERY 14 DAYS 6 mL 3    cyclobenzaprine (FLEXERIL) 10 mg tablet cyclobenzaprine 10 mg tablet      DULoxetine (CYMBALTA) 20 mg capsule Take 1 capsule (20 mg total) by mouth daily With 30mg 90 capsule 1    DULoxetine (CYMBALTA) 30 mg delayed release capsule Take 1 capsule (30 mg total) by mouth daily With 20mg (Patient taking differently: Take 20 mg by mouth daily With 20mg ) 90 capsule 1    estradiol (VAGIFEM, YUVAFEM) 10 MCG TABS vaginal tablet Insert 1 tab intravaginally daily x 2 weeks; then twice weekly   Ferrous Fumarate 325 (106 Fe) MG TABS Take 5 mL by mouth once a week Patient takes 125 mg daily      folic acid (FOLVITE) 1 mg tablet Take 2 mg by mouth daily       gabapentin (NEURONTIN) 100 mg capsule 1 cap at night and increase to BID after 3 days then TID after 3 days if needed 90 capsule 1    hydroxychloroquine (PLAQUENIL) 200 mg tablet Take 200 mg by mouth daily with breakfast       Magnesium Gluconate 27 5 MG TABS Take 400 mg by mouth daily       medroxyPROGESTERone (PROVERA) 2 5 mg tablet Take 2 5 mg by mouth      Methotrexate 15 MG/0 6ML SOSY Inject 0 6 mL under the skin once a week       pravastatin (PRAVACHOL) 10 mg tablet TAKE 1 TABLET BY MOUTH EVERYDAY AT BEDTIME 90 tablet 3    RITUXIMAB IV Infuse into a venous catheter      SYRINGE-NEEDLE, DISP, 3 ML (B-D 3CC LUER-RITESH SYR 23GX1") 23G X 1" 3 ML MISC Inject into a muscle every 14 (fourteen) days 50 each 0    valACYclovir (VALTREX) 1,000 mg tablet Take one tab twice a day for three days with onset of shingles 12 tablet 2    colestipol (COLESTID) 1 g tablet Take 4 tablets (4 g total) by mouth daily (Patient not taking: Reported on 4/22/2022 )      predniSONE 10 mg tablet Take 1 tablet (10 mg total) by mouth daily       No current facility-administered medications for this visit          Allergies   Allergen Reactions    Infliximab Other (See Comments)     Drug induced lupus    Cimzia [Certolizumab Pegol] Other (See Comments)     Joint pain, headache, exhaustion    Abatacept Rash    Sulfasalazine Rash       Review of Systems   Constitutional: Positive for fatigue  Respiratory: Negative for shortness of breath  Cardiovascular: Negative for chest pain and palpitations  Gastrointestinal: Negative for abdominal pain, constipation and diarrhea  Genitourinary: Negative for difficulty urinating  Musculoskeletal: Positive for arthralgias and joint swelling  Neurological: Positive for headaches  Video Exam    Vitals:    04/22/22 0941   Pulse: 73   Temp: (!) 97 3 °F (36 3 °C)   SpO2: 99%   Weight: 61 2 kg (135 lb)   Height: 5' 5" (1 651 m)       Physical Exam  Constitutional:       General: She is not in acute distress  Appearance: She is not ill-appearing, toxic-appearing or diaphoretic  Pulmonary:      Effort: No respiratory distress  Psychiatric:         Mood and Affect: Mood normal          Behavior: Behavior normal           I spent 30 minutes directly with the patient during this visit    VIRTUAL VISIT [place] verbally agrees to participate in [employer]  Pt is aware that [employer] could be limited without vital signs or the ability to perform a full hands-on physical Javiergus Kaylin understands she or the provider may request at any time to terminate the video visit and request the patient to seek care or treatment in person

## 2022-04-25 DIAGNOSIS — B02.29 POST HERPETIC NEURALGIA: ICD-10-CM

## 2022-04-25 RX ORDER — GABAPENTIN 100 MG/1
CAPSULE ORAL
Qty: 90 CAPSULE | Refills: 1 | Status: SHIPPED | OUTPATIENT
Start: 2022-04-25 | End: 2022-04-26 | Stop reason: SDUPTHER

## 2022-05-12 ENCOUNTER — NEW PATIENT (OUTPATIENT)
Dept: URBAN - METROPOLITAN AREA CLINIC 33 | Facility: CLINIC | Age: 62
End: 2022-05-12

## 2022-05-12 DIAGNOSIS — H04.123: ICD-10-CM

## 2022-05-12 DIAGNOSIS — Z79.899: ICD-10-CM

## 2022-05-12 PROCEDURE — 92004 COMPRE OPH EXAM NEW PT 1/>: CPT

## 2022-05-12 PROCEDURE — 92134 CPTRZ OPH DX IMG PST SGM RTA: CPT

## 2022-05-12 ASSESSMENT — VISUAL ACUITY
OS_SC: 20/40
OD_SC: 20/25-1

## 2022-05-12 ASSESSMENT — TONOMETRY
OD_IOP_MMHG: 11
OS_IOP_MMHG: 12

## 2022-06-06 DIAGNOSIS — M79.7 FIBROMYALGIA: ICD-10-CM

## 2022-06-06 RX ORDER — DULOXETIN HYDROCHLORIDE 20 MG/1
20 CAPSULE, DELAYED RELEASE ORAL DAILY
Qty: 90 CAPSULE | Refills: 1 | Status: SHIPPED | OUTPATIENT
Start: 2022-06-06

## 2022-06-17 DIAGNOSIS — B02.29 POST HERPETIC NEURALGIA: ICD-10-CM

## 2022-06-17 DIAGNOSIS — E53.8 B12 DEFICIENCY: ICD-10-CM

## 2022-06-21 DIAGNOSIS — E53.8 B12 DEFICIENCY: ICD-10-CM

## 2022-06-21 RX ORDER — CYANOCOBALAMIN 1000 UG/ML
1000 INJECTION INTRAMUSCULAR; SUBCUTANEOUS
Qty: 10 ML | Refills: 0 | Status: SHIPPED | OUTPATIENT
Start: 2022-06-21 | End: 2022-06-29

## 2022-06-21 RX ORDER — GABAPENTIN 100 MG/1
CAPSULE ORAL
Qty: 90 CAPSULE | Refills: 0 | Status: SHIPPED | OUTPATIENT
Start: 2022-06-21

## 2022-06-29 RX ORDER — CYANOCOBALAMIN 1000 UG/ML
1000 INJECTION INTRAMUSCULAR; SUBCUTANEOUS
Qty: 10 ML | Refills: 0 | Status: SHIPPED | OUTPATIENT
Start: 2022-06-29

## 2022-08-20 DIAGNOSIS — B02.29 POST HERPETIC NEURALGIA: ICD-10-CM

## 2022-08-20 RX ORDER — GABAPENTIN 100 MG/1
CAPSULE ORAL
Qty: 90 CAPSULE | Refills: 0 | Status: SHIPPED | OUTPATIENT
Start: 2022-08-20 | End: 2022-10-23

## 2022-09-08 DIAGNOSIS — M79.7 FIBROMYALGIA: ICD-10-CM

## 2022-10-06 DIAGNOSIS — E78.1 ESSENTIAL HYPERTRIGLYCERIDEMIA: ICD-10-CM

## 2022-10-06 RX ORDER — PRAVASTATIN SODIUM 10 MG
TABLET ORAL
Qty: 90 TABLET | Refills: 2 | Status: SHIPPED | OUTPATIENT
Start: 2022-10-06

## 2022-10-07 DIAGNOSIS — E53.8 B12 DEFICIENCY: ICD-10-CM

## 2022-10-07 DIAGNOSIS — G47.61 PLMD (PERIODIC LIMB MOVEMENT DISORDER): ICD-10-CM

## 2022-10-08 RX ORDER — CYANOCOBALAMIN 1000 UG/ML
1000 INJECTION, SOLUTION INTRAMUSCULAR; SUBCUTANEOUS
Qty: 10 ML | Refills: 0 | Status: SHIPPED | OUTPATIENT
Start: 2022-10-08

## 2022-10-08 RX ORDER — CLONAZEPAM 1 MG/1
2 TABLET ORAL
Qty: 60 TABLET | Refills: 1 | Status: SHIPPED | OUTPATIENT
Start: 2022-10-08

## 2022-10-23 DIAGNOSIS — B02.29 POST HERPETIC NEURALGIA: ICD-10-CM

## 2022-10-23 RX ORDER — GABAPENTIN 100 MG/1
CAPSULE ORAL
Qty: 90 CAPSULE | Refills: 0 | Status: SHIPPED | OUTPATIENT
Start: 2022-10-23

## 2022-12-31 DIAGNOSIS — B02.29 POST HERPETIC NEURALGIA: ICD-10-CM

## 2022-12-31 RX ORDER — GABAPENTIN 100 MG/1
CAPSULE ORAL
Qty: 90 CAPSULE | Refills: 0 | Status: SHIPPED | OUTPATIENT
Start: 2022-12-31

## 2023-01-01 DIAGNOSIS — G47.61 PLMD (PERIODIC LIMB MOVEMENT DISORDER): ICD-10-CM

## 2023-01-03 ENCOUNTER — TELEPHONE (OUTPATIENT)
Dept: INTERNAL MEDICINE CLINIC | Facility: CLINIC | Age: 63
End: 2023-01-03

## 2023-01-03 DIAGNOSIS — G47.61 PLMD (PERIODIC LIMB MOVEMENT DISORDER): ICD-10-CM

## 2023-01-03 DIAGNOSIS — B02.9 HERPES ZOSTER WITHOUT COMPLICATION: ICD-10-CM

## 2023-01-03 RX ORDER — VALACYCLOVIR HYDROCHLORIDE 1 G/1
TABLET, FILM COATED ORAL
Qty: 12 TABLET | Refills: 2 | Status: SHIPPED | OUTPATIENT
Start: 2023-01-03 | End: 2023-04-27

## 2023-01-03 RX ORDER — CLONAZEPAM 1 MG/1
2 TABLET ORAL
Qty: 60 TABLET | Refills: 3 | Status: SHIPPED | OUTPATIENT
Start: 2023-01-03

## 2023-01-03 RX ORDER — CLONAZEPAM 1 MG/1
2 TABLET ORAL
Qty: 60 TABLET | Refills: 0 | Status: SHIPPED | OUTPATIENT
Start: 2023-01-03 | End: 2023-01-03 | Stop reason: SDUPTHER

## 2023-01-03 NOTE — TELEPHONE ENCOUNTER
Pt had a telemedicine visit on 04/22/2022  Last in person visit was 09/30/2021  Last labs done 06/10/2021    [facility]

## 2023-01-03 NOTE — TELEPHONE ENCOUNTER
Pt called for Rx refill Clonazepam  It was sent over to the pharmacy with 4 refills in the note section  The refills need to be in the refill section for the pharmacy to honor them  Pt asked if you can resend this to the pharmacy   Please call pt once done so she can  from [facility]

## 2023-01-10 DIAGNOSIS — E53.8 B12 DEFICIENCY: ICD-10-CM

## 2023-01-10 RX ORDER — CYANOCOBALAMIN 1000 UG/ML
1000 INJECTION, SOLUTION INTRAMUSCULAR; SUBCUTANEOUS
Qty: 10 ML | Refills: 0 | Status: SHIPPED | OUTPATIENT
Start: 2023-01-10

## 2023-08-31 DIAGNOSIS — E78.1 ESSENTIAL HYPERTRIGLYCERIDEMIA: ICD-10-CM

## 2023-08-31 RX ORDER — PRAVASTATIN SODIUM 10 MG
TABLET ORAL
Qty: 90 TABLET | Refills: 1 | OUTPATIENT
Start: 2023-08-31

## 2023-09-01 NOTE — TELEPHONE ENCOUNTER
Pt has a doctor in [facility] that she is currently seeing. She will have that provider take care of this refill.